# Patient Record
Sex: FEMALE | Race: WHITE | NOT HISPANIC OR LATINO | Employment: FULL TIME | URBAN - METROPOLITAN AREA
[De-identification: names, ages, dates, MRNs, and addresses within clinical notes are randomized per-mention and may not be internally consistent; named-entity substitution may affect disease eponyms.]

---

## 2017-03-03 ENCOUNTER — HOSPITAL ENCOUNTER (OUTPATIENT)
Dept: LAB | Facility: MEDICAL CENTER | Age: 22
End: 2017-03-03
Attending: SPECIALIST

## 2017-03-03 PROCEDURE — 87591 N.GONORRHOEAE DNA AMP PROB: CPT

## 2017-03-03 PROCEDURE — 87624 HPV HI-RISK TYP POOLED RSLT: CPT

## 2017-03-03 PROCEDURE — 87491 CHLMYD TRACH DNA AMP PROBE: CPT

## 2017-03-03 PROCEDURE — 88175 CYTOPATH C/V AUTO FLUID REDO: CPT

## 2017-03-07 LAB
C TRACH DNA GENITAL QL NAA+PROBE: NEGATIVE
CYTOLOGY REG CYTOL: NORMAL
HPV HR 12 DNA CVX QL NAA+PROBE: NEGATIVE
HPV16 DNA SPEC QL NAA+PROBE: NEGATIVE
HPV18 DNA SPEC QL NAA+PROBE: NEGATIVE
N GONORRHOEA DNA GENITAL QL NAA+PROBE: NEGATIVE
SPECIMEN SOURCE: NORMAL
SPECIMEN SOURCE: NORMAL

## 2017-11-19 ENCOUNTER — APPOINTMENT (OUTPATIENT)
Dept: RADIOLOGY | Facility: MEDICAL CENTER | Age: 22
End: 2017-11-19
Attending: EMERGENCY MEDICINE
Payer: COMMERCIAL

## 2017-11-19 ENCOUNTER — HOSPITAL ENCOUNTER (EMERGENCY)
Facility: MEDICAL CENTER | Age: 22
End: 2017-11-19
Attending: EMERGENCY MEDICINE
Payer: COMMERCIAL

## 2017-11-19 VITALS
HEART RATE: 91 BPM | SYSTOLIC BLOOD PRESSURE: 128 MMHG | DIASTOLIC BLOOD PRESSURE: 72 MMHG | RESPIRATION RATE: 16 BRPM | WEIGHT: 121.47 LBS | TEMPERATURE: 97.6 F | HEIGHT: 62 IN | OXYGEN SATURATION: 95 % | BODY MASS INDEX: 22.35 KG/M2

## 2017-11-19 DIAGNOSIS — R19.7 VOMITING AND DIARRHEA: ICD-10-CM

## 2017-11-19 DIAGNOSIS — R11.10 VOMITING AND DIARRHEA: ICD-10-CM

## 2017-11-19 DIAGNOSIS — Z34.90 INTRAUTERINE PREGNANCY: ICD-10-CM

## 2017-11-19 DIAGNOSIS — E86.0 DEHYDRATION: ICD-10-CM

## 2017-11-19 LAB
ALBUMIN SERPL BCP-MCNC: 3.9 G/DL (ref 3.2–4.9)
ALBUMIN/GLOB SERPL: 1.3 G/DL
ALP SERPL-CCNC: 47 U/L (ref 30–99)
ALT SERPL-CCNC: 8 U/L (ref 2–50)
ANION GAP SERPL CALC-SCNC: 9 MMOL/L (ref 0–11.9)
APPEARANCE UR: CLEAR
AST SERPL-CCNC: 13 U/L (ref 12–45)
B-HCG SERPL-ACNC: ABNORMAL MIU/ML (ref 0–5)
BASOPHILS # BLD AUTO: 0.3 % (ref 0–1.8)
BASOPHILS # BLD: 0.04 K/UL (ref 0–0.12)
BILIRUB SERPL-MCNC: 0.8 MG/DL (ref 0.1–1.5)
BILIRUB UR QL STRIP.AUTO: NEGATIVE
BUN SERPL-MCNC: 9 MG/DL (ref 8–22)
CALCIUM SERPL-MCNC: 9.1 MG/DL (ref 8.5–10.5)
CHLORIDE SERPL-SCNC: 104 MMOL/L (ref 96–112)
CO2 SERPL-SCNC: 19 MMOL/L (ref 20–33)
COLOR UR: YELLOW
CREAT SERPL-MCNC: 0.52 MG/DL (ref 0.5–1.4)
CULTURE IF INDICATED INDCX: NO UA CULTURE
EOSINOPHIL # BLD AUTO: 0.05 K/UL (ref 0–0.51)
EOSINOPHIL NFR BLD: 0.4 % (ref 0–6.9)
ERYTHROCYTE [DISTWIDTH] IN BLOOD BY AUTOMATED COUNT: 40.6 FL (ref 35.9–50)
GFR SERPL CREATININE-BSD FRML MDRD: >60 ML/MIN/1.73 M 2
GLOBULIN SER CALC-MCNC: 3 G/DL (ref 1.9–3.5)
GLUCOSE SERPL-MCNC: 92 MG/DL (ref 65–99)
GLUCOSE UR STRIP.AUTO-MCNC: NEGATIVE MG/DL
HCT VFR BLD AUTO: 40.9 % (ref 37–47)
HGB BLD-MCNC: 14 G/DL (ref 12–16)
IMM GRANULOCYTES # BLD AUTO: 0.05 K/UL (ref 0–0.11)
IMM GRANULOCYTES NFR BLD AUTO: 0.4 % (ref 0–0.9)
KETONES UR STRIP.AUTO-MCNC: >=160 MG/DL
LEUKOCYTE ESTERASE UR QL STRIP.AUTO: NEGATIVE
LIPASE SERPL-CCNC: 10 U/L (ref 11–82)
LYMPHOCYTES # BLD AUTO: 1.06 K/UL (ref 1–4.8)
LYMPHOCYTES NFR BLD: 9.2 % (ref 22–41)
MCH RBC QN AUTO: 29.7 PG (ref 27–33)
MCHC RBC AUTO-ENTMCNC: 34.2 G/DL (ref 33.6–35)
MCV RBC AUTO: 86.7 FL (ref 81.4–97.8)
MICRO URNS: NORMAL
MONOCYTES # BLD AUTO: 0.5 K/UL (ref 0–0.85)
MONOCYTES NFR BLD AUTO: 4.3 % (ref 0–13.4)
NEUTROPHILS # BLD AUTO: 9.81 K/UL (ref 2–7.15)
NEUTROPHILS NFR BLD: 85.4 % (ref 44–72)
NITRITE UR QL STRIP.AUTO: NEGATIVE
NRBC # BLD AUTO: 0 K/UL
NRBC BLD AUTO-RTO: 0 /100 WBC
NUMBER OF RH DOSES IND 8505RD: NORMAL
PH UR STRIP.AUTO: 5 [PH]
PLATELET # BLD AUTO: 231 K/UL (ref 164–446)
PMV BLD AUTO: 9.7 FL (ref 9–12.9)
POTASSIUM SERPL-SCNC: 3.8 MMOL/L (ref 3.6–5.5)
PROT SERPL-MCNC: 6.9 G/DL (ref 6–8.2)
PROT UR QL STRIP: NEGATIVE MG/DL
RBC # BLD AUTO: 4.72 M/UL (ref 4.2–5.4)
RBC UR QL AUTO: NEGATIVE
RH BLD: NORMAL
SODIUM SERPL-SCNC: 132 MMOL/L (ref 135–145)
SP GR UR STRIP.AUTO: 1.01
UROBILINOGEN UR STRIP.AUTO-MCNC: 0.2 MG/DL
WBC # BLD AUTO: 11.5 K/UL (ref 4.8–10.8)

## 2017-11-19 PROCEDURE — 700111 HCHG RX REV CODE 636 W/ 250 OVERRIDE (IP): Performed by: EMERGENCY MEDICINE

## 2017-11-19 PROCEDURE — 84702 CHORIONIC GONADOTROPIN TEST: CPT

## 2017-11-19 PROCEDURE — 83690 ASSAY OF LIPASE: CPT

## 2017-11-19 PROCEDURE — 81003 URINALYSIS AUTO W/O SCOPE: CPT

## 2017-11-19 PROCEDURE — 96360 HYDRATION IV INFUSION INIT: CPT

## 2017-11-19 PROCEDURE — 99285 EMERGENCY DEPT VISIT HI MDM: CPT

## 2017-11-19 PROCEDURE — 76817 TRANSVAGINAL US OBSTETRIC: CPT

## 2017-11-19 PROCEDURE — 86901 BLOOD TYPING SEROLOGIC RH(D): CPT

## 2017-11-19 PROCEDURE — 80053 COMPREHEN METABOLIC PANEL: CPT

## 2017-11-19 PROCEDURE — 700105 HCHG RX REV CODE 258: Performed by: EMERGENCY MEDICINE

## 2017-11-19 PROCEDURE — 85025 COMPLETE CBC W/AUTO DIFF WBC: CPT

## 2017-11-19 PROCEDURE — 76815 OB US LIMITED FETUS(S): CPT

## 2017-11-19 PROCEDURE — 96361 HYDRATE IV INFUSION ADD-ON: CPT

## 2017-11-19 RX ORDER — ONDANSETRON 4 MG/1
4 TABLET, ORALLY DISINTEGRATING ORAL ONCE
Status: COMPLETED | OUTPATIENT
Start: 2017-11-19 | End: 2017-11-19

## 2017-11-19 RX ORDER — SODIUM CHLORIDE 9 MG/ML
INJECTION, SOLUTION INTRAVENOUS CONTINUOUS
Status: DISCONTINUED | OUTPATIENT
Start: 2017-11-19 | End: 2017-11-19 | Stop reason: HOSPADM

## 2017-11-19 RX ORDER — ONDANSETRON 4 MG/1
4 TABLET, FILM COATED ORAL EVERY 4 HOURS PRN
Qty: 10 TAB | Refills: 0 | Status: ON HOLD | OUTPATIENT
Start: 2017-11-19 | End: 2018-04-22

## 2017-11-19 RX ADMIN — ONDANSETRON 4 MG: 4 TABLET, ORALLY DISINTEGRATING ORAL at 15:14

## 2017-11-19 RX ADMIN — SODIUM CHLORIDE: 9 INJECTION, SOLUTION INTRAVENOUS at 13:00

## 2017-11-19 ASSESSMENT — PAIN SCALES - GENERAL: PAINLEVEL_OUTOF10: 0

## 2017-11-19 NOTE — ED NOTES
Chief Complaint   Patient presents with   • N/V     x 6 since 0630   • Pregnancy     Approximately 18 weeks.  Pt reports she has been getting prenatal care at [facility].    • Abdominal Cramps     Pt denies bleeding    Pt to triage in NAD.  Pt educated on triage process and instructed to notify triage RN of any change in status.

## 2017-11-19 NOTE — ED PROVIDER NOTES
"ED Provider Note  CHIEF COMPLAINT  Chief Complaint   Patient presents with   • N/V     x 6 since 0630   • Pregnancy     Approximately 18 weeks.  Pt reports she has been getting prenatal care at [facility].    • Abdominal Cramps     Pt denies bleeding        HPI  Cinthia Washington is a 22 y.o. female who presentsWith nausea, vomiting, and abdominal cramps that started about 6:30 this morning. She's had no prenatal care here at the hospital. She supposedly is about 18 weeks pregnant. She really has no abdominal pain. She's had no vaginal bleeding or vaginal discharge. Is mainly been the nausea and vomiting with the cramps.    REVIEW OF SYSTEMS  No headache, no chest pain, no difficulty breathing. No blood in her bowel movement. No urinary symptoms.  ALL OTHER SYSTEMS NEGATIVE    ALLERGIES  No Known Allergies    PAST MEDICAL HISTORY  Negative      FAMILY HISTORY  Family History   Problem Relation Age of Onset   • Cancer Father      prostate       SOCIAL HISTORY  Negative    PHYSICAL EXAM  GENERAL: Alert dehydrated female adult  VITAL SIGNS: /72   Pulse (!) 102   Temp 36.4 °C (97.6 °F) (Temporal)   Resp 16   Ht 1.575 m (5' 2\")   Wt 55.1 kg (121 lb 7.6 oz)   SpO2 97%   BMI 22.22 kg/m²   Constitutional: Alert healthy-appearing dehydrated female adult   HENT: Scalp is normal size and nontender. Ears are clear. Nose is clear. Throat is clear with no stridor no drooling no trismus. Teeth are all intact.  Eyes: Pupils equal round and reactive to light, extraocular motor fall. There is no scleral icterus.  Neck: Neck is supple and nontender. There is no meningismus. No adenitis. No thyromegaly.  Lymphatic: No adenopathy.   Cardiovascular: Heart regular rhythm without murmurs or gallops   Thorax & Lungs: No chest wall tenderness. Lungs are clear. Patient has good breath sounds bilateral. No rales, no rhonchi, no wheezes.  Abdomen: Abdomen is soft, nontender, not rigid, no guarding, and no organomegaly. There " is no palpable hernia   Skin: Warm, pink, and dry with no erythema and no rash.   Back: Nontender, no midline bony tenderness, no flank tenderness.   uterus is palpable below the umbilicus. She has no vaginal bleeding and no vaginal discharge.  Extremities: Full range of motion  No tenderness to palpation and no deformities noted. No calf or thigh swelling. No calf or thigh tenderness. No clinical DVT.  Neurologic: Alert & oriented . Cranial nerves are grossly intact as tested. Patient moves all 4 extremities well. Patient has good strong flexion and extension of the ankle joints knee joints hip joints and elbow joints. Sensation is normal and symmetrical in the upper and lower extremities.   Psychiatric: Patient is alert oriented coherent and rational.     RADIOLOGY/PROCEDURES  US-OB LIMITED TRANSABDOMINAL   Final Result      Single intrauterine pregnancy of an estimated gestational age of 17 weeks 4 days with an estimated date of delivery of 4/25/2018.               COURSE & MEDICAL DECISION MAKING  Patient presents for evaluation of abdominal cramps. She's had no prenatal care here. She is supposedly about 18 weeks pregnant.    Plan: #1 IV #2 intravenous Zofran and Dilaudid No. 3 lab including CBC, CMP, quantitative hCG, Rh, ProTime. #4. Pelvic ultrasound    Laboratory and reexamination: Ultrasound shows a 17-1/2 week intrauterine pregnancy. Stable. I count slightly elevated. No anemia. No bandemia. Chemistry panel normal with no renal or liver dysfunction. Rh is positive.    Results for orders placed or performed during the hospital encounter of 11/19/17   CBC WITH DIFFERENTIAL   Result Value Ref Range    WBC 11.5 (H) 4.8 - 10.8 K/uL    RBC 4.72 4.20 - 5.40 M/uL    Hemoglobin 14.0 12.0 - 16.0 g/dL    Hematocrit 40.9 37.0 - 47.0 %    MCV 86.7 81.4 - 97.8 fL    MCH 29.7 27.0 - 33.0 pg    MCHC 34.2 33.6 - 35.0 g/dL    RDW 40.6 35.9 - 50.0 fL    Platelet Count 231 164 - 446 K/uL    MPV 9.7 9.0 - 12.9 fL     Neutrophils-Polys 85.40 (H) 44.00 - 72.00 %    Lymphocytes 9.20 (L) 22.00 - 41.00 %    Monocytes 4.30 0.00 - 13.40 %    Eosinophils 0.40 0.00 - 6.90 %    Basophils 0.30 0.00 - 1.80 %    Immature Granulocytes 0.40 0.00 - 0.90 %    Nucleated RBC 0.00 /100 WBC    Neutrophils (Absolute) 9.81 (H) 2.00 - 7.15 K/uL    Lymphs (Absolute) 1.06 1.00 - 4.80 K/uL    Monos (Absolute) 0.50 0.00 - 0.85 K/uL    Eos (Absolute) 0.05 0.00 - 0.51 K/uL    Baso (Absolute) 0.04 0.00 - 0.12 K/uL    Immature Granulocytes (abs) 0.05 0.00 - 0.11 K/uL    NRBC (Absolute) 0.00 K/uL   COMP METABOLIC PANEL   Result Value Ref Range    Sodium 132 (L) 135 - 145 mmol/L    Potassium 3.8 3.6 - 5.5 mmol/L    Chloride 104 96 - 112 mmol/L    Co2 19 (L) 20 - 33 mmol/L    Anion Gap 9.0 0.0 - 11.9    Glucose 92 65 - 99 mg/dL    Bun 9 8 - 22 mg/dL    Creatinine 0.52 0.50 - 1.40 mg/dL    Calcium 9.1 8.5 - 10.5 mg/dL    AST(SGOT) 13 12 - 45 U/L    ALT(SGPT) 8 2 - 50 U/L    Alkaline Phosphatase 47 30 - 99 U/L    Total Bilirubin 0.8 0.1 - 1.5 mg/dL    Albumin 3.9 3.2 - 4.9 g/dL    Total Protein 6.9 6.0 - 8.2 g/dL    Globulin 3.0 1.9 - 3.5 g/dL    A-G Ratio 1.3 g/dL   LIPASE   Result Value Ref Range    Lipase 10 (L) 11 - 82 U/L   HCG QUANTITATIVE SERUM   Result Value Ref Range    Bhcg 99089.9 (H) 0.0 - 5.0 mIU/mL   RH TYPE FOR RHOGAM FROM E.D.   Result Value Ref Range    Emergency Department Rh Typing POS     Number Of Rh Doses Indicated ZERO    ESTIMATED GFR   Result Value Ref Range    GFR If African American >60 >60 mL/min/1.73 m 2    GFR If Non African American >60 >60 mL/min/1.73 m 2      At 4 PM the patient is awake and oriented. She is well-hydrated. She feels significantly better. She is now well-hydrated. She stable for discharge. She been given a copy of all her lab and ultrasound results. She'll follow up with her OB or return as needed. Instructions on gastroenteritis given.  FINAL IMPRESSION  1. Vomiting and diarrhea and gastroenteritis  2. Dehydration  3. Stable intrauterine pregnancy 17-1/2 weeks         Electronically signed by: Gary Gansert, 11/19/2017 /4 PM

## 2017-11-19 NOTE — DISCHARGE INSTRUCTIONS
Dehydration, Adult  Dehydration is when you lose more fluids from the body than you take in. Vital organs like the kidneys, brain, and heart cannot function without a proper amount of fluids and salt. Any loss of fluids from the body can cause dehydration.   CAUSES   · Vomiting.  · Diarrhea.  · Excessive sweating.  · Excessive urine output.  · Fever.  SYMPTOMS   Mild dehydration  · Thirst.  · Dry lips.  · Slightly dry mouth.  Moderate dehydration  · Very dry mouth.  · Sunken eyes.  · Skin does not bounce back quickly when lightly pinched and released.  · Dark urine and decreased urine production.  · Decreased tear production.  · Headache.  Severe dehydration  · Very dry mouth.  · Extreme thirst.  · Rapid, weak pulse (more than 100 beats per minute at rest).  · Cold hands and feet.  · Not able to sweat in spite of heat and temperature.  · Rapid breathing.  · Blue lips.  · Confusion and lethargy.  · Difficulty being awakened.  · Minimal urine production.  · No tears.  DIAGNOSIS   Your caregiver will diagnose dehydration based on your symptoms and your exam. Blood and urine tests will help confirm the diagnosis. The diagnostic evaluation should also identify the cause of dehydration.  TREATMENT   Treatment of mild or moderate dehydration can often be done at home by increasing the amount of fluids that you drink. It is best to drink small amounts of fluid more often. Drinking too much at one time can make vomiting worse. Refer to the home care instructions below.  Severe dehydration needs to be treated at the hospital where you will probably be given intravenous (IV) fluids that contain water and electrolytes.  HOME CARE INSTRUCTIONS   · Ask your caregiver about specific rehydration instructions.  · Drink enough fluids to keep your urine clear or pale yellow.  · Drink small amounts frequently if you have nausea and vomiting.  · Eat as you normally do.  · Avoid:  ¨ Foods or drinks high in sugar.  ¨ Carbonated  drinks.  ¨ Juice.  ¨ Extremely hot or cold fluids.  ¨ Drinks with caffeine.  ¨ Fatty, greasy foods.  ¨ Alcohol.  ¨ Tobacco.  ¨ Overeating.  ¨ Gelatin desserts.  · Wash your hands well to avoid spreading bacteria and viruses.  · Only take over-the-counter or prescription medicines for pain, discomfort, or fever as directed by your caregiver.  · Ask your caregiver if you should continue all prescribed and over-the-counter medicines.  · Keep all follow-up appointments with your caregiver.  SEEK MEDICAL CARE IF:  · You have abdominal pain and it increases or stays in one area (localizes).  · You have a rash, stiff neck, or severe headache.  · You are irritable, sleepy, or difficult to awaken.  · You are weak, dizzy, or extremely thirsty.  SEEK IMMEDIATE MEDICAL CARE IF:   · You are unable to keep fluids down or you get worse despite treatment.  · You have frequent episodes of vomiting or diarrhea.  · You have blood or green matter (bile) in your vomit.  · You have blood in your stool or your stool looks black and tarry.  · You have not urinated in 6 to 8 hours, or you have only urinated a small amount of very dark urine.  · You have a fever.  · You faint.  MAKE SURE YOU:   · Understand these instructions.  · Will watch your condition.  · Will get help right away if you are not doing well or get worse.     This information is not intended to replace advice given to you by your health care provider. Make sure you discuss any questions you have with your health care provider.     Document Released: 12/18/2006 Document Revised: 03/11/2013 Document Reviewed: 08/06/2012  [employer] Interactive Patient Education ©2016 [employer]    Diarrhea  Diarrhea is frequent loose and watery bowel movements. It can cause you to feel weak and dehydrated. Dehydration can cause you to become tired and thirsty, have a dry mouth, and have decreased urination that often is dark yellow. Diarrhea is a sign of another problem, most often an  infection that will not last long. In most cases, diarrhea typically lasts 2-3 days. However, it can last longer if it is a sign of something more serious. It is important to treat your diarrhea as directed by your caregiver to lessen or prevent future episodes of diarrhea.  CAUSES   Some common causes include:  · Gastrointestinal infections caused by viruses, bacteria, or parasites.  · Food poisoning or food allergies.  · Certain medicines, such as antibiotics, chemotherapy, and laxatives.  · Artificial sweeteners and fructose.  · Digestive disorders.  HOME CARE INSTRUCTIONS  · Ensure adequate fluid intake (hydration): Have 1 cup (8 oz) of fluid for each diarrhea episode. Avoid fluids that contain simple sugars or sports drinks, fruit juices, whole milk products, and sodas. Your urine should be clear or pale yellow if you are drinking enough fluids. Hydrate with an oral rehydration solution that you can purchase at pharmacies, retail stores, and online. You can prepare an oral rehydration solution at home by mixing the following ingredients together:  ¨  - tsp table salt.  ¨ ¾ tsp baking soda.  ¨  tsp salt substitute containing potassium chloride.  ¨ 1  tablespoons sugar.  ¨ 1 L (34 oz) of water.  · Certain foods and beverages may increase the speed at which food moves through the gastrointestinal (GI) tract. These foods and beverages should be avoided and include:  ¨ Caffeinated and alcoholic beverages.  ¨ High-fiber foods, such as raw fruits and vegetables, nuts, seeds, and whole grain breads and cereals.  ¨ Foods and beverages sweetened with sugar alcohols, such as xylitol, sorbitol, and mannitol.  · Some foods may be well tolerated and may help thicken stool including:  ¨ Starchy foods, such as rice, toast, pasta, low-sugar cereal, oatmeal, grits, baked potatoes, crackers, and bagels.  ¨ Bananas.  ¨ Applesauce.  · Add probiotic-rich foods to help increase healthy bacteria in the GI tract, such as yogurt and  fermented milk products.  · Wash your hands well after each diarrhea episode.  · Only take over-the-counter or prescription medicines as directed by your caregiver.  · Take a warm bath to relieve any burning or pain from frequent diarrhea episodes.  SEEK IMMEDIATE MEDICAL CARE IF:   · You are unable to keep fluids down.  · You have persistent vomiting.  · You have blood in your stool, or your stools are black and tarry.  · You do not urinate in 6-8 hours, or there is only a small amount of very dark urine.  · You have abdominal pain that increases or localizes.  · You have weakness, dizziness, confusion, or light-headedness.  · You have a severe headache.  · Your diarrhea gets worse or does not get better.  · You have a fever or persistent symptoms for more than 2-3 days.  · You have a fever and your symptoms suddenly get worse.  MAKE SURE YOU:   · Understand these instructions.  · Will watch your condition.  · Will get help right away if you are not doing well or get worse.     This information is not intended to replace advice given to you by your health care provider. Make sure you discuss any questions you have with your health care provider.     Document Released: 12/08/2003 Document Revised: 01/08/2016 Document Reviewed: 08/25/2013  [employer] Interactive Patient Education ©2016 [employer]

## 2018-03-15 ENCOUNTER — APPOINTMENT (OUTPATIENT)
Dept: OTHER | Facility: IMAGING CENTER | Age: 23
End: 2018-03-15

## 2018-03-27 ENCOUNTER — HOSPITAL ENCOUNTER (OUTPATIENT)
Dept: LAB | Facility: MEDICAL CENTER | Age: 23
End: 2018-03-27
Attending: NURSE PRACTITIONER
Payer: COMMERCIAL

## 2018-03-27 PROCEDURE — 87653 STREP B DNA AMP PROBE: CPT

## 2018-03-29 LAB — GP B STREP DNA SPEC QL NAA+PROBE: NEGATIVE

## 2018-04-15 ENCOUNTER — HOSPITAL ENCOUNTER (OUTPATIENT)
Facility: MEDICAL CENTER | Age: 23
End: 2018-04-15
Attending: OBSTETRICS & GYNECOLOGY | Admitting: OBSTETRICS & GYNECOLOGY
Payer: COMMERCIAL

## 2018-04-15 VITALS
SYSTOLIC BLOOD PRESSURE: 113 MMHG | RESPIRATION RATE: 16 BRPM | HEART RATE: 97 BPM | TEMPERATURE: 98.3 F | DIASTOLIC BLOOD PRESSURE: 69 MMHG

## 2018-04-15 PROCEDURE — 59025 FETAL NON-STRESS TEST: CPT | Performed by: OBSTETRICS & GYNECOLOGY

## 2018-04-15 NOTE — PROGRESS NOTES
0955-pt presents from home with c/o uc's this morning, no c/o leaking, bleeding or other pain, states baby is moving normally, placed on external monitors, vs taken, SVE 3/50/ballotable, was 3 cm earlier in the week,   1025-TC Dr Higginbotham, report given, discharge orders received  1030-pt discharged home with labor precautions, verbalized understanding, left ambulatory with family

## 2018-04-15 NOTE — CONSULTS
DATE OF SERVICE:  04/15/2018    HISTORY OF PRESENT ILLNESS:  Patient is a prior patient of Dr. Vickie Espinoza's, a 22-year-old female who presents to labor and delivery   complaining of contractions.  She was watched for over an hour and only had a   few contractions on the monitor.  She was checked and found to be 3 cm   dilated, which she also was on her visit last week in the office.  Fetal   tracing is category 1.  She will be sent home.  Kick counts, labor warnings   given.  She will return if contractions become more frequent, regular or   leaking of fluid.       ____________________________________     MD JU DIALLO / MEME    DD:  04/15/2018 10:34:10  DT:  04/15/2018 11:12:36    D#:  [id number]  Job#:  [id number]

## 2018-04-21 ENCOUNTER — HOSPITAL ENCOUNTER (INPATIENT)
Facility: MEDICAL CENTER | Age: 23
LOS: 3 days | End: 2018-04-24
Attending: OBSTETRICS & GYNECOLOGY | Admitting: OBSTETRICS & GYNECOLOGY
Payer: COMMERCIAL

## 2018-04-21 LAB
BASOPHILS # BLD AUTO: 0.3 % (ref 0–1.8)
BASOPHILS # BLD: 0.03 K/UL (ref 0–0.12)
EOSINOPHIL # BLD AUTO: 0.15 K/UL (ref 0–0.51)
EOSINOPHIL NFR BLD: 1.5 % (ref 0–6.9)
ERYTHROCYTE [DISTWIDTH] IN BLOOD BY AUTOMATED COUNT: 41.7 FL (ref 35.9–50)
HCT VFR BLD AUTO: 33.9 % (ref 37–47)
HGB BLD-MCNC: 11.1 G/DL (ref 12–16)
HOLDING TUBE BB 8507: NORMAL
IMM GRANULOCYTES # BLD AUTO: 0.11 K/UL (ref 0–0.11)
IMM GRANULOCYTES NFR BLD AUTO: 1.1 % (ref 0–0.9)
LYMPHOCYTES # BLD AUTO: 2.7 K/UL (ref 1–4.8)
LYMPHOCYTES NFR BLD: 27.5 % (ref 22–41)
MCH RBC QN AUTO: 28.5 PG (ref 27–33)
MCHC RBC AUTO-ENTMCNC: 32.7 G/DL (ref 33.6–35)
MCV RBC AUTO: 87.1 FL (ref 81.4–97.8)
MONOCYTES # BLD AUTO: 0.91 K/UL (ref 0–0.85)
MONOCYTES NFR BLD AUTO: 9.3 % (ref 0–13.4)
NEUTROPHILS # BLD AUTO: 5.91 K/UL (ref 2–7.15)
NEUTROPHILS NFR BLD: 60.3 % (ref 44–72)
NRBC # BLD AUTO: 0 K/UL
NRBC BLD-RTO: 0 /100 WBC
PLATELET # BLD AUTO: 246 K/UL (ref 164–446)
PMV BLD AUTO: 10 FL (ref 9–12.9)
RBC # BLD AUTO: 3.89 M/UL (ref 4.2–5.4)
WBC # BLD AUTO: 9.8 K/UL (ref 4.8–10.8)

## 2018-04-21 PROCEDURE — 85025 COMPLETE CBC W/AUTO DIFF WBC: CPT

## 2018-04-21 PROCEDURE — 770002 HCHG ROOM/CARE - OB PRIVATE (112)

## 2018-04-21 RX ORDER — DEXTROSE, SODIUM CHLORIDE, SODIUM LACTATE, POTASSIUM CHLORIDE, AND CALCIUM CHLORIDE 5; .6; .31; .03; .02 G/100ML; G/100ML; G/100ML; G/100ML; G/100ML
INJECTION, SOLUTION INTRAVENOUS CONTINUOUS
Status: DISCONTINUED | OUTPATIENT
Start: 2018-04-22 | End: 2018-04-22 | Stop reason: HOSPADM

## 2018-04-21 RX ORDER — ALUMINA, MAGNESIA, AND SIMETHICONE 2400; 2400; 240 MG/30ML; MG/30ML; MG/30ML
30 SUSPENSION ORAL EVERY 6 HOURS PRN
Status: DISCONTINUED | OUTPATIENT
Start: 2018-04-21 | End: 2018-04-22 | Stop reason: HOSPADM

## 2018-04-21 RX ORDER — MISOPROSTOL 200 UG/1
800 TABLET ORAL
Status: DISCONTINUED | OUTPATIENT
Start: 2018-04-21 | End: 2018-04-22

## 2018-04-21 RX ORDER — CITRIC ACID/SODIUM CITRATE 334-500MG
30 SOLUTION, ORAL ORAL EVERY 6 HOURS PRN
Status: DISCONTINUED | OUTPATIENT
Start: 2018-04-21 | End: 2018-04-22 | Stop reason: HOSPADM

## 2018-04-21 RX ORDER — ONDANSETRON 4 MG/1
4 TABLET, ORALLY DISINTEGRATING ORAL EVERY 6 HOURS PRN
OUTPATIENT
Start: 2018-04-21

## 2018-04-21 RX ORDER — ONDANSETRON 2 MG/ML
4 INJECTION INTRAMUSCULAR; INTRAVENOUS EVERY 6 HOURS PRN
OUTPATIENT
Start: 2018-04-21

## 2018-04-21 RX ORDER — SODIUM CHLORIDE, SODIUM LACTATE, POTASSIUM CHLORIDE, CALCIUM CHLORIDE 600; 310; 30; 20 MG/100ML; MG/100ML; MG/100ML; MG/100ML
INJECTION, SOLUTION INTRAVENOUS CONTINUOUS
Status: DISPENSED | OUTPATIENT
Start: 2018-04-21 | End: 2018-04-22

## 2018-04-22 PROCEDURE — 700101 HCHG RX REV CODE 250

## 2018-04-22 PROCEDURE — 303615 HCHG EPIDURAL/SPINAL ANESTHESIA FOR LABOR

## 2018-04-22 PROCEDURE — 85027 COMPLETE CBC AUTOMATED: CPT

## 2018-04-22 PROCEDURE — 36415 COLL VENOUS BLD VENIPUNCTURE: CPT

## 2018-04-22 PROCEDURE — A9270 NON-COVERED ITEM OR SERVICE: HCPCS | Performed by: OBSTETRICS & GYNECOLOGY

## 2018-04-22 PROCEDURE — 770002 HCHG ROOM/CARE - OB PRIVATE (112)

## 2018-04-22 PROCEDURE — 700111 HCHG RX REV CODE 636 W/ 250 OVERRIDE (IP): Performed by: OBSTETRICS & GYNECOLOGY

## 2018-04-22 PROCEDURE — 700105 HCHG RX REV CODE 258: Performed by: ANESTHESIOLOGY

## 2018-04-22 PROCEDURE — 700102 HCHG RX REV CODE 250 W/ 637 OVERRIDE(OP): Performed by: OBSTETRICS & GYNECOLOGY

## 2018-04-22 PROCEDURE — 0HQ9XZZ REPAIR PERINEUM SKIN, EXTERNAL APPROACH: ICD-10-PCS | Performed by: OBSTETRICS & GYNECOLOGY

## 2018-04-22 PROCEDURE — 304965 HCHG RECOVERY SERVICES

## 2018-04-22 PROCEDURE — 59409 OBSTETRICAL CARE: CPT

## 2018-04-22 PROCEDURE — 700105 HCHG RX REV CODE 258

## 2018-04-22 PROCEDURE — 700111 HCHG RX REV CODE 636 W/ 250 OVERRIDE (IP)

## 2018-04-22 PROCEDURE — 700105 HCHG RX REV CODE 258: Performed by: OBSTETRICS & GYNECOLOGY

## 2018-04-22 RX ORDER — SODIUM CHLORIDE, SODIUM LACTATE, POTASSIUM CHLORIDE, CALCIUM CHLORIDE 600; 310; 30; 20 MG/100ML; MG/100ML; MG/100ML; MG/100ML
INJECTION, SOLUTION INTRAVENOUS PRN
Status: DISCONTINUED | OUTPATIENT
Start: 2018-04-22 | End: 2018-04-24 | Stop reason: HOSPADM

## 2018-04-22 RX ORDER — SODIUM CHLORIDE, SODIUM LACTATE, POTASSIUM CHLORIDE, CALCIUM CHLORIDE 600; 310; 30; 20 MG/100ML; MG/100ML; MG/100ML; MG/100ML
1000 INJECTION, SOLUTION INTRAVENOUS CONTINUOUS
Status: DISCONTINUED | OUTPATIENT
Start: 2018-04-22 | End: 2018-04-24 | Stop reason: HOSPADM

## 2018-04-22 RX ORDER — ROPIVACAINE HYDROCHLORIDE 2 MG/ML
INJECTION, SOLUTION EPIDURAL; INFILTRATION; PERINEURAL
Status: COMPLETED
Start: 2018-04-22 | End: 2018-04-22

## 2018-04-22 RX ORDER — OXYCODONE AND ACETAMINOPHEN 10; 325 MG/1; MG/1
1 TABLET ORAL EVERY 4 HOURS PRN
Status: DISCONTINUED | OUTPATIENT
Start: 2018-04-22 | End: 2018-04-24 | Stop reason: HOSPADM

## 2018-04-22 RX ORDER — IBUPROFEN 600 MG/1
600 TABLET ORAL EVERY 6 HOURS PRN
Status: DISCONTINUED | OUTPATIENT
Start: 2018-04-22 | End: 2018-04-24 | Stop reason: HOSPADM

## 2018-04-22 RX ORDER — SODIUM CHLORIDE, SODIUM LACTATE, POTASSIUM CHLORIDE, CALCIUM CHLORIDE 600; 310; 30; 20 MG/100ML; MG/100ML; MG/100ML; MG/100ML
INJECTION, SOLUTION INTRAVENOUS
Status: COMPLETED
Start: 2018-04-22 | End: 2018-04-22

## 2018-04-22 RX ORDER — DOCUSATE SODIUM 100 MG/1
100 CAPSULE, LIQUID FILLED ORAL 2 TIMES DAILY PRN
Status: DISCONTINUED | OUTPATIENT
Start: 2018-04-22 | End: 2018-04-24 | Stop reason: HOSPADM

## 2018-04-22 RX ORDER — MISOPROSTOL 200 UG/1
600 TABLET ORAL
Status: DISCONTINUED | OUTPATIENT
Start: 2018-04-22 | End: 2018-04-24 | Stop reason: HOSPADM

## 2018-04-22 RX ORDER — VITAMIN A ACETATE, BETA CAROTENE, ASCORBIC ACID, CHOLECALCIFEROL, .ALPHA.-TOCOPHEROL ACETATE, DL-, THIAMINE MONONITRATE, RIBOFLAVIN, NIACINAMIDE, PYRIDOXINE HYDROCHLORIDE, FOLIC ACID, CYANOCOBALAMIN, CALCIUM CARBONATE, FERROUS FUMARATE, ZINC OXIDE, CUPRIC OXIDE 3080; 12; 120; 400; 1; 1.84; 3; 20; 22; 920; 25; 200; 27; 10; 2 [IU]/1; UG/1; MG/1; [IU]/1; MG/1; MG/1; MG/1; MG/1; MG/1; [IU]/1; MG/1; MG/1; MG/1; MG/1; MG/1
1 TABLET, FILM COATED ORAL EVERY MORNING
Status: DISCONTINUED | OUTPATIENT
Start: 2018-04-23 | End: 2018-04-24 | Stop reason: HOSPADM

## 2018-04-22 RX ORDER — OXYCODONE HYDROCHLORIDE AND ACETAMINOPHEN 5; 325 MG/1; MG/1
1 TABLET ORAL EVERY 4 HOURS PRN
Status: DISCONTINUED | OUTPATIENT
Start: 2018-04-22 | End: 2018-04-24 | Stop reason: HOSPADM

## 2018-04-22 RX ORDER — RANITIDINE 150 MG/1
150 TABLET ORAL PRN
Status: ON HOLD | COMMUNITY
End: 2018-04-24

## 2018-04-22 RX ORDER — IBUPROFEN 600 MG/1
600 TABLET ORAL EVERY 6 HOURS PRN
Status: CANCELLED | OUTPATIENT
Start: 2018-04-22

## 2018-04-22 RX ORDER — OXYCODONE HYDROCHLORIDE AND ACETAMINOPHEN 5; 325 MG/1; MG/1
1 TABLET ORAL EVERY 4 HOURS PRN
Status: CANCELLED | OUTPATIENT
Start: 2018-04-22

## 2018-04-22 RX ADMIN — IBUPROFEN 600 MG: 600 TABLET, FILM COATED ORAL at 16:39

## 2018-04-22 RX ADMIN — OXYCODONE HYDROCHLORIDE AND ACETAMINOPHEN 1 TABLET: 5; 325 TABLET ORAL at 21:44

## 2018-04-22 RX ADMIN — ALUMINUM HYDROXIDE, MAGNESIUM HYDROXIDE,SIMETHICONE 30 ML: 400; 400; 40 LIQUID ORAL at 12:50

## 2018-04-22 RX ADMIN — ROPIVACAINE HYDROCHLORIDE 100 ML: 2 INJECTION, SOLUTION EPIDURAL; INFILTRATION at 09:00

## 2018-04-22 RX ADMIN — SODIUM CHLORIDE, SODIUM LACTATE, POTASSIUM CHLORIDE, CALCIUM CHLORIDE AND DEXTROSE MONOHYDRATE: 5; 600; 310; 30; 20 INJECTION, SOLUTION INTRAVENOUS at 10:07

## 2018-04-22 RX ADMIN — OXYCODONE HYDROCHLORIDE AND ACETAMINOPHEN 1 TABLET: 5; 325 TABLET ORAL at 16:50

## 2018-04-22 RX ADMIN — EPHEDRINE SULFATE 5 MG: 50 INJECTION INTRAVENOUS at 10:07

## 2018-04-22 RX ADMIN — Medication 125 ML/HR: at 16:40

## 2018-04-22 RX ADMIN — SODIUM CHLORIDE, POTASSIUM CHLORIDE, SODIUM LACTATE AND CALCIUM CHLORIDE 1000 ML: 600; 310; 30; 20 INJECTION, SOLUTION INTRAVENOUS at 08:15

## 2018-04-22 RX ADMIN — Medication 1 MILLI-UNITS/MIN: at 06:02

## 2018-04-22 RX ADMIN — SODIUM CHLORIDE, POTASSIUM CHLORIDE, SODIUM LACTATE AND CALCIUM CHLORIDE 1000 ML: 600; 310; 30; 20 INJECTION, SOLUTION INTRAVENOUS at 09:05

## 2018-04-22 ASSESSMENT — LIFESTYLE VARIABLES
ALCOHOL_USE: NO
DO YOU DRINK ALCOHOL: NO
DO YOU DRINK ALCOHOL: NO
EVER_SMOKED: NEVER

## 2018-04-22 ASSESSMENT — PAIN SCALES - GENERAL: PAINLEVEL_OUTOF10: 5

## 2018-04-22 ASSESSMENT — PATIENT HEALTH QUESTIONNAIRE - PHQ9
2. FEELING DOWN, DEPRESSED, IRRITABLE, OR HOPELESS: NOT AT ALL
SUM OF ALL RESPONSES TO PHQ9 QUESTIONS 1 AND 2: 0
1. LITTLE INTEREST OR PLEASURE IN DOING THINGS: NOT AT ALL

## 2018-04-22 NOTE — DELIVERY NOTE
Viable male infant  AG= at 1539  Placenta intact 3VC  Perineal skin lac repaired sub-Q and interrupted fashion  EBL= 300cc  No complications

## 2018-04-22 NOTE — DELIVERY NOTE
DATE OF SERVICE:  04/22/2018    DELIVERY NOTE:  This is a patient of Dr. Espinoza's I delivered while on call.    The patient came in active labor.  She did have a prolonged second stage and   was started on Pitocin for augmentation.  She received an epidural.  She had   rupture of membranes with meconium.  She went on to deliver a viable male   infant by spontaneous vaginal delivery over sterile prep.  Apgars equal to 9   and 9 at 15 and 39.  Only gentle traction was used in aid of delivery of the   infant.  Infant delivered in DOMENIC position.  Infant's mouth was bulb suctioned   on the perineum.  Rest of infant delivered and was placed on mother's stomach.    There was instantaneous spontaneous cry.  Cord gas segments for arterial gas   was sent.  Cord was allowed to pulsate as mentioned clamped and cut.    Placenta delivered intact, 3-vessel cord shortly thereafter.  Pitocin was   infused IV.  Patient had a perineal skin laceration, which was repaired with   2-0 chromic in a subcuticular fashion with 2 interrupted skin sutures to help   with tension.  Estimated blood loss was 300 mL.  There were no complications.       ____________________________________     MD INDRA LOPEZ / NTS    DD:  04/22/2018 16:03:05  DT:  04/22/2018 16:16:19    D#:  [id number]  Job#:  [id number]    cc: [facility]

## 2018-04-22 NOTE — PROGRESS NOTES
EDC 2018   EGA 39w6d    2240: Pt presents this evening with c/o of UC's that started a couple hours ago. Pt states contractions are painful and rates them a 5 on a pain scale 0 to10. Pt declines LOF, VB, and states +FM. Pt states she was 3 cm dilated when she was check in the office early during the week.     2243: SVE /-1    2244: Dr. Gonzalez updated with patient status, orders received to admit patient to labor and delivery.     2248: Pt updated with POC, all questions and concerns addressed.     2315: Report given to VANESSA Silva RN.

## 2018-04-22 NOTE — PROGRESS NOTES
0050 Report received from VANESSA Silva RN at bedside and assumed care. POC discussed with pt and s/o and encouraged to state needs or questions at any time.    0435 SVE by RN 4/80/-1    0535 Dr. Gonzalez Called and given update.    0700 Report given to VANIA Olivares RN at bedside.

## 2018-04-22 NOTE — PROGRESS NOTES
0700- Report received. Care assumed. Pt feeling mild contractions. Desires epidural at some point. Timing of such discussed. Denies questions/concerns.   0805- Desires epidural before AROM.   0848- Sitting up for epidural.  0858- Dr. Sepulveda at bedside.   0914- Laying down after epidural. No side effect from test dose.  0917- Pt feeling dizzy and nauseous. BP 89/51. HOB lowered, pt to greater left wedge, fluids opened. Legs elevated on pillows. Repeat /57. Pt feeling better. Dr. Sepulveda notified. RN to give Ephedrine for SBP < 100. Next BP 89/50. By the time Ephedrine obtained, hypotension resolved.   1004- Dr. Gonzalez at bedside. SVE and AROM. Pt turned to right wedge and legs lowered.   1006- BP 83/50. Pt dizzy with ringing ears. Legs elevated.  1007- Ephedrine given. IVF bolusing.  1010- /65. Pt feeling better.   1055- Update to Dr. Sepulveda. Will continue POC and consider dropping epidural rate if hypotension persists.   1335- Pt comfortable since epidural. VSS with category 1 FHT. Changing side to side to peanut ball. Meconium moderate.  1432- SVE complete.   1448- Pushing started.  1539-  of vigorous male infant with 9/9 apgars.  1650- Medicated with Ibuprofen and Percocet (see MAR for exact times) for perineal/rectal pain /10. Perineum and labia edematous and bruised. Soft to touch. Ice pack changed. HOB lowered. Will monitor closely.

## 2018-04-22 NOTE — H&P
CHIEF COMPLAINT:  Labor.    HISTORY OF PRESENT ILLNESS:  A 22-year-old G2, P1-0-0-1 with intrauterine   pregnancy at 39-5/7th weeks, comes in with complaints of labor.  She was   checked earlier in the week, was told she was 3 cm.  She now is 4 cm dilated   and in an active labor pattern.  She states she is having painful   contractions.  She otherwise has good fetal movement.  No vaginal bleeding.    No loss of fluid.  No signs or symptoms of preeclampsia.  She relates an   uncomplicated pregnancy.    PAST MEDICAL HISTORY:  None.    PAST SURGICAL HISTORY:  None.    ALLERGIES:  No known drug allergies.    SOCIAL HISTORY:  Denies tobacco, alcohol or drug use.    FAMILY HISTORY:  Noncontributory.    MEDICATIONS:  Vitamins.    SEXUAL HISTORY:  Denies history of herpes for herself.  Father of baby denies   history of herpes for himself.    OBSTETRICAL HISTORY:  Patient had 1 term vaginal delivery that was   vacuum-assisted.    REVIEW OF SYSTEMS:  As in HPI, otherwise negative for greater than 10 systems.    She does not have any chest pain nor heart palpitations.  No signs or   symptoms of DVT.    PHYSICAL EXAMINATION:  VITAL SIGNS:  Blood pressure 123/69, pulse 82, temperature 97.6, weight 143   pounds.  GENERAL:  Patient in some pain with contractions, but otherwise in no apparent   distress.  ABDOMEN:  Gravid, soft, nontender, nondistended.  No masses.  No guarding,   rebound, no peritoneal signs.  GENITOURINARY:  Per nurse, external genitalia is normal, no lesions.  Vagina   is normal, no lesions.  Cervix 4 cm dilated.  Uterus nontender.  Adnexa, no   masses.  EXTREMITIES:  No signs or symptoms of DVT.    ASSESSMENT:  1.  Intrauterine pregnancy at term.  2.  Active labor.  3.  Group B streptococcus negative.    PLAN:  Our plan is to admit patient and undergo expectant management.  She   would like pain management such as epidural.  Expect to have spontaneous   vaginal delivery.  Estimated fetal weight is 3100 g.        ____________________________________     MD INDRA LOPEZ    DD:  04/21/2018 23:38:52  DT:  04/21/2018 23:53:17    D#:  [id number]  Job#:  [id number]    cc: [facility]

## 2018-04-22 NOTE — CARE PLAN
Problem: Pain  Goal: Alleviation of Pain or a reduction in pain to the patient's comfort goal  Outcome: PROGRESSING AS EXPECTED  Pt is coping well with pain at this time but would like an epidural as labor progresses. Will continue to assess.    Problem: Risk for Infection, Impaired Wound Healing  Goal: Remain free from signs and symptoms of infection  Outcome: PROGRESSING AS EXPECTED  Pt is afebrile and free from s/s of infection at this time. Will continue to assess.

## 2018-04-23 LAB
ERYTHROCYTE [DISTWIDTH] IN BLOOD BY AUTOMATED COUNT: 41.1 FL (ref 35.9–50)
HCT VFR BLD AUTO: 28.4 % (ref 37–47)
HGB BLD-MCNC: 9.4 G/DL (ref 12–16)
MCH RBC QN AUTO: 28.8 PG (ref 27–33)
MCHC RBC AUTO-ENTMCNC: 33.1 G/DL (ref 33.6–35)
MCV RBC AUTO: 87.1 FL (ref 81.4–97.8)
PLATELET # BLD AUTO: 201 K/UL (ref 164–446)
PMV BLD AUTO: 9.8 FL (ref 9–12.9)
RBC # BLD AUTO: 3.26 M/UL (ref 4.2–5.4)
WBC # BLD AUTO: 10.7 K/UL (ref 4.8–10.8)

## 2018-04-23 PROCEDURE — 770002 HCHG ROOM/CARE - OB PRIVATE (112)

## 2018-04-23 PROCEDURE — 700102 HCHG RX REV CODE 250 W/ 637 OVERRIDE(OP): Performed by: OBSTETRICS & GYNECOLOGY

## 2018-04-23 PROCEDURE — 700112 HCHG RX REV CODE 229: Performed by: OBSTETRICS & GYNECOLOGY

## 2018-04-23 PROCEDURE — A9270 NON-COVERED ITEM OR SERVICE: HCPCS | Performed by: OBSTETRICS & GYNECOLOGY

## 2018-04-23 RX ADMIN — OXYCODONE HYDROCHLORIDE AND ACETAMINOPHEN 1 TABLET: 5; 325 TABLET ORAL at 18:05

## 2018-04-23 RX ADMIN — IBUPROFEN 600 MG: 600 TABLET, FILM COATED ORAL at 05:04

## 2018-04-23 RX ADMIN — OXYCODONE HYDROCHLORIDE AND ACETAMINOPHEN 1 TABLET: 5; 325 TABLET ORAL at 05:04

## 2018-04-23 RX ADMIN — IBUPROFEN 600 MG: 600 TABLET, FILM COATED ORAL at 18:05

## 2018-04-23 RX ADMIN — Medication 1 TABLET: at 09:04

## 2018-04-23 RX ADMIN — OXYCODONE HYDROCHLORIDE AND ACETAMINOPHEN 1 TABLET: 5; 325 TABLET ORAL at 22:59

## 2018-04-23 RX ADMIN — OXYCODONE HYDROCHLORIDE AND ACETAMINOPHEN 1 TABLET: 5; 325 TABLET ORAL at 10:43

## 2018-04-23 RX ADMIN — DOCUSATE SODIUM 100 MG: 100 CAPSULE ORAL at 02:00

## 2018-04-23 ASSESSMENT — PAIN SCALES - GENERAL
PAINLEVEL_OUTOF10: 5
PAINLEVEL_OUTOF10: 2
PAINLEVEL_OUTOF10: 4
PAINLEVEL_OUTOF10: 6
PAINLEVEL_OUTOF10: 2
PAINLEVEL_OUTOF10: 1
PAINLEVEL_OUTOF10: 3
PAINLEVEL_OUTOF10: 1
PAINLEVEL_OUTOF10: 2
PAINLEVEL_OUTOF10: 5
PAINLEVEL_OUTOF10: 2

## 2018-04-23 NOTE — PROGRESS NOTES
PPD#1 s/p     S/ feels good, just swollen from delivery, baby latching  O/  Vitals:    18 1610 18 2000 18 0000 18 0748   BP: 116/58 107/72 110/71 120/75   Pulse: 97 60 96 95   Resp:  16 16 20   Temp:  36.8 °C (98.2 °F) 36.6 °C (97.8 °F) 36.7 °C (98 °F)   TempSrc:       SpO2:  96% 98% 97%   Weight:       Height:         gen-nad  Abd-soft, ff below umb  ext-no edema  Noy-yan labial swelling, moderate    Recent Labs      18   2347  18   2355   WBC  9.8  10.7   RBC  3.89*  3.26*   HEMOGLOBIN  11.1*  9.4*   HEMATOCRIT  33.9*  28.4*   MCV  87.1  87.1   MCH  28.5  28.8   RDW  41.7  41.1   PLATELETCT  246  201   MPV  10.0  9.8   NEUTSPOLYS  60.30   --    LYMPHOCYTES  27.50   --    MONOCYTES  9.30   --    EOSINOPHILS  1.50   --    BASOPHILS  0.30   --      A&p/ppd#1 s/p   Stable  Cpm  Anticipate d/c in am

## 2018-04-23 NOTE — PROGRESS NOTES
1935- Bedside report received from JHON Earl.  Patient denied needs.  0712- Patient assessment done.  Patient stated that she is voiding without difficulty and passing flatus.  Patient denied dizziness and stated that she is walking without difficulty.  Discussed pain management plan and patient prefers to call for pain intervention as needed.  Reviewed plan of care.  FOB at bedside.

## 2018-04-23 NOTE — PROGRESS NOTES
Pt to new room and assisted to bed-bedside report received-pt assessed-ice to perineum for pain and edema-tucks and spray to bathroom and pt educated on lorena care- to mom and bonding is evident-pt educated on call/emergency light and security of infant.

## 2018-04-23 NOTE — PROGRESS NOTES
1745- Pt up to the BR. Voided. Steady on feet. Pericare and education done. Pt verbalized understanding. Ice pack placed

## 2018-04-23 NOTE — CARE PLAN
Problem: Pain Management  Goal: Pain level will decrease to patient's comfort goal  Pt pain at a comfortable level, will continue to monitor and medicate per MAR    Problem: Potential knowledge deficit related to lack of understanding of self and  care  Goal: Patient will verbalize understanding of self and infant care  Pt able to care for self and infant.

## 2018-04-23 NOTE — PROGRESS NOTES
Assumed care of patient, report at bedside from, Meseret FERREIRA. Assessment completed and WDL. Call light within reach, discussed plan of care, denies pain at the moment. Will continue to monitor.

## 2018-04-24 VITALS
OXYGEN SATURATION: 99 % | TEMPERATURE: 97.6 F | HEART RATE: 80 BPM | DIASTOLIC BLOOD PRESSURE: 67 MMHG | RESPIRATION RATE: 18 BRPM | SYSTOLIC BLOOD PRESSURE: 97 MMHG | WEIGHT: 143 LBS | HEIGHT: 62 IN | BODY MASS INDEX: 26.31 KG/M2

## 2018-04-24 PROCEDURE — 700102 HCHG RX REV CODE 250 W/ 637 OVERRIDE(OP): Performed by: OBSTETRICS & GYNECOLOGY

## 2018-04-24 PROCEDURE — 700112 HCHG RX REV CODE 229: Performed by: OBSTETRICS & GYNECOLOGY

## 2018-04-24 PROCEDURE — A9270 NON-COVERED ITEM OR SERVICE: HCPCS | Performed by: OBSTETRICS & GYNECOLOGY

## 2018-04-24 RX ADMIN — Medication 1 TABLET: at 08:21

## 2018-04-24 RX ADMIN — OXYCODONE HYDROCHLORIDE AND ACETAMINOPHEN 1 TABLET: 5; 325 TABLET ORAL at 04:21

## 2018-04-24 RX ADMIN — DOCUSATE SODIUM 100 MG: 100 CAPSULE ORAL at 08:32

## 2018-04-24 RX ADMIN — IBUPROFEN 600 MG: 600 TABLET, FILM COATED ORAL at 04:21

## 2018-04-24 ASSESSMENT — PAIN SCALES - GENERAL
PAINLEVEL_OUTOF10: 0
PAINLEVEL_OUTOF10: 0
PAINLEVEL_OUTOF10: 6
PAINLEVEL_OUTOF10: 1

## 2018-04-24 ASSESSMENT — LIFESTYLE VARIABLES: EVER_SMOKED: NEVER

## 2018-04-24 NOTE — PROGRESS NOTES
0900- Discharge instructions given to patient by JHON Lai, who reported that the patient verbalized understanding and signed discharge papers.  0940- Patient stated that she is ready for discharge.  Patient discharged to home, no change noted in condition, via wheelchair with infant and FOB.

## 2018-04-24 NOTE — PROGRESS NOTES
Discharge instruction for mom and baby discussed. Questions and concerns have been answered. Sleep sack given.

## 2018-04-24 NOTE — DISCHARGE INSTRUCTIONS
POSTPARTUM DISCHARGE INSTRUCTIONS FOR MOM    YOB: 1995   Age: 22 y.o.               Admit Date: 4/21/2018     Discharge Date: 4/24/2018  Attending Doctor:  Vickie Espinoza M.D.                  Allergies:  Patient has no known allergies.    Discharged to home by car. Discharged via wheelchair, hospital escort: Yes.  Special equipment needed: Not Applicable  Belongings with: Personal  Be sure to schedule a follow-up appointment with your primary care doctor or any specialists as instructed.     Discharge Plan:   Diet Plan: Discussed  Activity Level: Discussed  Confirmed Follow up Appointment: Patient to Call and Schedule Appointment  Confirmed Symptoms Management: Discussed  Medication Reconciliation Updated: Yes  Influenza Vaccine Indication: Not indicated: Previously immunized this influenza season and > 8 years of age    REASONS TO CALL YOUR OBSTETRICIAN:  1.   Persistent fever or shaking chills (Temperature higher than 100.4)  2.   Heavy bleeding (soaking more than 1 pad per hour); Passing clots  3.   Foul odor from vagina  4.   Mastitis (Breast infection; breast pain, chills, fever, redness)  5.   Urinary pain, burning or frequency  6.   Episiotomy infection  7.   Severe depression longer than 24 hours    HAND WASHING  · Prior to handling the baby.  · Before breastfeeding or bottle feeding baby.  · After using the bathroom or changing the baby's diaper.    VAGINAL CARE  · Nothing inside vagina for 6 weeks: no sexual intercourse, tampons or douching.  · Bleeding may continue for 2-4 weeks.  Amount may vary.    · Call your physician for heavy bleeding which means soaking more than 1 pad per hour    BIRTH CONTROL  · It is possible to become pregnant at any time after delivery and while breastfeeding.  · Plan to discuss a method of birth control with your physician at your follow up visit. visit.    DIET AND ELIMINATION  · Eating more fiber (bran cereal, fruits, and vegetables) and drinking plenty of  "fluids will help to avoid constipation.  · Urinary frequency after childbirth is normal.    POSTPARTUM BLUES  During the first few days after birth, you may experience a sense of the \"blues\" which may include impatience, irritability or even crying.  These feeling come and go quickly.  However, as many as 1 in 10 women experience emotional symptoms known as postpartum depression.  Postpartum depression:  May start as early as the second or third day after delivery or take several weeks or months to develop.  Symptoms of \"blues\" are present, but are more intense:  Crying spells; loss of appetite; feelings of hopelessness or loss of control; fear of touching the baby; over concern or no concern at all about the baby; little or no concern about your own appearance/caring for yourself; and/or inability to sleep or excessive sleeping.  Contact your physician if you are experiencing any of these symptoms.  Crisis Hotline:  · [place] Crisis Hotline:  8-470-TQITBXV  Or 1-537.951.1210  · [place] Crisis Hotline:  1-817.852.1172  Or 511-577-5853    PREVENTING SHAKEN BABY:  If you are angry or stressed, PUT THE BABY IN THE CRIB, step away, take some deep breaths, and wait until you are calm to care for the baby.  DO NOT SHAKE THE BABY.  You are not alone, call a supporter for help.  · Crisis Call Center 24/7 crisis line 027-319-5090 or 1-202.231.9154  · You can also text them, text \"ANSWER\" to 856941    QUIT SMOKING/TOBACCO USE:  I understand the use of any tobacco products increases my chance of suffering from future heart disease and could cause other illnesses which may shorten my life. Quitting the use of tobacco products is the single most important thing I can do to improve my health. For further information on smoking / tobacco cessation call a Toll Free Quit Line at 1-121.569.3417 (*[facility] [place]) or 1-706.945.4986 (American Lung Association) or you can access the web based program at " www.lungusa.org.  · [place] Tobacco Users Help Line:  (664) 911-6039       Toll Free: 1-984.216.9222  · Quit Tobacco Program [facility] (481)541-3924    DEPRESSION / SUICIDE RISK:  As you are discharged from this [facility], it is important to learn how to keep safe from harming yourself.    Recognize the warning signs:  · Abrupt changes in personality, positive or negative- including increase in energy   · Giving away possessions  · Change in eating patterns- significant weight changes-  positive or negative  · Change in sleeping patterns- unable to sleep or sleeping all the time   · Unwillingness or inability to communicate  · Depression  · Unusual sadness, discouragement and loneliness  · Talk of wanting to die  · Neglect of personal appearance   · Rebelliousness- reckless behavior  · Withdrawal from people/activities they love  · Confusion- inability to concentrate     If you or a loved one observes any of these behaviors or has concerns about self-harm, here's what you can do:  · Talk about it- your feelings and reasons for harming yourself  · Remove any means that you might use to hurt yourself (examples: pills, rope, extension cords, firearm)  · Get professional help from the community (Mental Health, Substance Abuse, psychological counseling)  · Do not be alone:Call your Safe Contact- someone whom you trust who will be there for you.  · Call your local CRISIS HOTLINE 800-0091 or 416-710-3690  · Call your local Children's Mobile Crisis Response Team [place] (104) 342-1785 or www.Radiance  · Call the toll free National Suicide Prevention Hotlines   · National Suicide Prevention Lifeline 533-877-ZRBE (1616)  · National Hope Line Network 800-SUICIDE (884-6171)    DISCHARGE SURVEY:  Thank you for choosing [facility].  We hope we provided you with very good care.  You may be receiving a survey.  Your opinion is valuable to us.    ADDITIONAL EDUCATIONAL MATERIALS  GIVEN TO PATIENT: none    My signature on this form indicates that:  1.  I have reviewed and understand the above information  2.  My questions regarding this information have been answered to my satisfaction.  3.  I have formulated a plan with my discharge nurse to obtain my prescribed medication for home.

## 2018-04-24 NOTE — PROGRESS NOTES
8398- Bedside report received from JHON Huerta.  Patient denied needs.  3138- Patient assessment done.  Patient stated that she is voiding without difficulty and passing flatus.  Patient denied dizziness and stated that she is walking without difficulty.  Discussed pain management plan and patient prefers to call for pain intervention as needed.  Reviewed plan of care.  FOB at bedside.

## 2018-04-24 NOTE — DISCHARGE SUMMARY
Discharge Summary:      Cinthia Portillo      Admit Date:   2018  Discharge Date:  2018     Admitting diagnosis:  Pregnancy   Normal labor  Discharge Diagnosis: Status post vaginal, spontaneous.  Pregnancy Complications: none  Tubal Ligation:  no        History:  Past Medical History:   Diagnosis Date   • Pregnant      OB History    Para Term  AB Living   2 1 1 0 0 1   SAB TAB Ectopic Molar Multiple Live Births   0 0 0   0 1      # Outcome Date GA Lbr Ankur/2nd Weight Sex Delivery Anes PTL Lv   2 Term 09/05/15 40w0d  3.345 kg (7 lb 6 oz) M Vag-Vacuum  N KATLIN   1                     Patient has no known allergies.  Patient Active Problem List    Diagnosis Date Noted   • Supervision of normal pregnancy 2015        Hospital Course:   22 y.o. , now para 2, was admitted with the above mentioned diagnosis, underwent Active Labor, vaginal, spontaneous. Patient postpartum course was unremarkable, with progressive advancement in diet , ambulation and toleration of oral analgesia. Patient without complaints today and desires discharge.      Vitals:    18 0000 18 0748 18 2000 18 0821   BP: 110/71 120/75 118/72 (!) 97/67   Pulse: 96 95 84 80   Resp: 16 20 16 18   Temp: 36.6 °C (97.8 °F) 36.7 °C (98 °F) 36.3 °C (97.4 °F) 36.4 °C (97.6 °F)   TempSrc:       SpO2: 98% 97% 95% 99%   Weight:       Height:           Current Facility-Administered Medications   Medication Dose   • oxytocin (PITOCIN) infusion (for postpartum)   mL/hr   • oxyCODONE-acetaminophen (PERCOCET-10)  MG per tablet 1 Tab  1 Tab   • oxytocin (PITOCIN) 20 UNITS/1000ML LR (induction of labor)  0.5-20 rodriguez-units/min   • lactated ringers infusion  1,000 mL   • LR infusion     • miSOPROStol (CYTOTEC) tablet 600 mcg  600 mcg   • docusate sodium (COLACE) capsule 100 mg  100 mg   • magnesium hydroxide (MILK OF MAGNESIA) suspension 30 mL  30 mL   • prenatal plus vitamin (STUARTNATAL 1+1)  27-1 MG tablet 1 Tab  1 Tab   • PRN oxytocin (PITOCIN) (20 Units/1000 mL) PRN for excessive uterine bleeding - See Admin Instr  125-999 mL/hr   • ibuprofen (MOTRIN) tablet 600 mg  600 mg   • oxyCODONE-acetaminophen (PERCOCET) 5-325 MG per tablet 1 Tab  1 Tab       Exam:  Breast Exam: negative  Abdomen: Abdomen soft, non-tender. BS normal. No masses,  No organomegaly  Fundus Non Tender: yes  Incision: none  Perineum: perineum intact  Extremity: extremities, peripheral pulses and reflexes normal     Labs:  Recent Labs      04/21/18   2347  04/22/18   2355   WBC  9.8  10.7   RBC  3.89*  3.26*   HEMOGLOBIN  11.1*  9.4*   HEMATOCRIT  33.9*  28.4*   MCV  87.1  87.1   MCH  28.5  28.8   MCHC  32.7*  33.1*   RDW  41.7  41.1   PLATELETCT  246  201   MPV  10.0  9.8        Activity:   Discharge to home  Pelvic Rest x 6 weeks    Assessment:  normal postpartum course  Discharge Assessment: Taking adequate diet and fluids     Follow up: .with Dr. Espinoza in 6 weeks      Discharge Meds:   No current outpatient prescriptions on file.       Vickie Espinoza M.D.

## 2018-10-24 ENCOUNTER — OFFICE VISIT (OUTPATIENT)
Dept: BEHAVIORAL HEALTH | Facility: CLINIC | Age: 23
End: 2018-10-24
Payer: COMMERCIAL

## 2018-10-24 DIAGNOSIS — Z63.0 MARITAL RELATIONSHIP PROBLEM: ICD-10-CM

## 2018-10-24 PROCEDURE — 90791 PSYCH DIAGNOSTIC EVALUATION: CPT | Performed by: SOCIAL WORKER

## 2018-10-24 SDOH — SOCIAL STABILITY - SOCIAL INSECURITY: PROBLEMS IN RELATIONSHIP WITH SPOUSE OR PARTNER: Z63.0

## 2018-10-25 NOTE — BH THERAPY
[facility]  INITIAL ASSESSMENT    Name: Cinthia Portillo  MRN: [id number]  : 1995  Age: 23 y.o.  Date of assessment: 10/25/2018  PCP: Pcp Pt States None  Persons in attendance: Patient and Spouse/Partner  Total session time: 45 minutes      CHIEF COMPLAINT AND HISTORY OF PRESENTING PROBLEM:  (as stated by Patient and Spouse/Partner):  Cinthia Portillo is a 23 y.o., White female referred for assessment by No ref. provider found.  Primary presenting issue includes   Chief Complaint   Patient presents with   • Initial  Evaluation   . Marital Relationship Problem    FAMILY/SOCIAL HISTORY  Current living situation/household members: Cinthia and Suly live together with their two sons ages three and six months.  Relevant family history/structure/dynamics: Cinthia reports she was raised with both parents until age [age] when they .  She has an older sister and one brother a year younger.  Reports good relationship with both parents and sister.  No conflict with brother though they are not close.    Suly was born and raised in [place] and has one younger brother.  Parents  when he was a young child and he spent time with both.  Reports good relationship with his father and stepmother and conflicted relationship with mother and brother.    This is the first marriage for both and they have two sons as per above.  Neither has other children.   Current family/social stressors: Current primary stressor is relationship problems in the marriage.  Both express stress related to responsibility of being  and parents of two young children.  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  Cinthia was at [employer] working to complete bachelor's degree in General Studies when she became pregnant and they decided to  and begin a family.  Each voices feeling disappointed they did not  Quality/quantity of current family and/or  social support: Support system includes each other frients, Cinthia's mother and Suly mother in law.   Does patient/parent report a family history of behavioral health issues, diagnoses, or treatment? No  Family History   Problem Relation Age of Onset   • Cancer Father         prostate        BEHAVIORAL HEALTH TREATMENT HISTORY  Does patient/parent report a history of prior behavioral health treatment for patient? No:    History of untreated behavioral health issues identified? No    MEDICAL HISTORY  Primary care behavioral health screenings: Patient Health Questionaire                                     If depressive symptoms identified deferred to follow up visit unless specifically addressed in assesment and plan.    Interpretation of PHQ-9 Total Score   Score Severity   1-4 No Depression   5-9 Mild Depression   10-14 Moderate Depression   15-19 Moderately Severe Depression   20-27 Severe Depression       Past medical/surgical history:   Past Medical History:   Diagnosis Date   • Pregnant       History reviewed. No pertinent surgical history.     Medication Allergies:  Patient has no known allergies.   Medical history provided by patient during current evaluation: None identified.    Patient reports last physical exam: unknown  Does patient/parent report any history of or current developmental concerns? No  Does patient/parent report nutritional concerns? No  Does patient/parent report change in appetite or weight loss/gain? No  Does patient/parent report history of eating disorder symptoms? No  Does patient/parent report dental problem? No  Does patient/parent report physical pain? No   Indicate if pain is acute or chronic, and location: none   Pain scale rating:       Does patient/parent report functional impact of medical, developmental, or pain issues?   no    EDUCATIONAL/LEARNING HISTORY  Is patient currently enrolled in a school/educational program?   No:   Highest grade level completed: Cinthia completed  three years at [employer].  Suly graduated from high school and completed [employer] certification.  School performance/functioning: unknown  History of Special Education/repeated grades/learning issues: no  Preferred learning style: not reported  Current learning needs (large print, language barrier, etc):  none    EMPLOYMENT/RESOURCES  Is the patient currently employed? 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   Does the patient/parent report adequate financial resources? Yes  Does patient identify impact of presenting issue on work functioning? No  Work or income-related stressors:  None reported     HISTORY:  Does patient report current or past enlistment? 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    [If yes, complete below items]  Does patient report history of exposure to combat? Unknown  Does patient report history of  sexual trauma? No  Does patient report other -related stressors? No    SPIRITUAL/CULTURAL/IDENTITY:  What are the patient’s/family’s spiritual beliefs or practices? Christians  What is the patient’s cultural or ethnic background/identity? Cinthia is , Suly   How does the patient identify their sexual orientation? Heterosexual  How does the patient identify their gender? Male and female.  Does the patient identify any spiritual/cultural/identity factors as relevant to the presenting issue? No    LEGAL HISTORY  Has the patient ever been involved with juvenile, adult, or family legal systems? No   [If yes, trigger section below:]  Does patient report ever being a victim of a crime?  No  Does patient report involvement in any current legal issues?  No  Does patient report ever being arrested or committing a crime? No  Does patient report any current agency (parole/probation/CPS/) involvement? No    ABUSE/NEGLECT/TRAUMA SCREENING  Does patient report feeling “unsafe” in his/her home, or afraid of  anyone? No  Does patient report any history of physical, sexual, or emotional abuse? No  Does parent or significant other report any of the above? No  Is there evidence of neglect by self? No  Is there evidence of neglect by a caregiver? No  Does the patient/parent report any history of CPS/APS/police involvement related to suspected abuse/neglect or domestic violence? No  Does the patient/parent report any other history of potentially traumatic life events? No  Based on the information provided during the current assessment, is a mandated report of suspected abuse/neglect being made?  No     SAFETY ASSESSMENT - SELF  Does patient acknowledge current or past symptoms of dangerousness to self? No  Does parent/significant other report patient has current or past symptoms of dangerousness to self? No      Recent change in frequency/specificity/intensity of suicidal thoughts or self-harm behavior? No  Current access to firearms, medications, or other identified means of suicide/self-harm? No  If yes, willing to restrict access to means of suicide/self-harm? No  Protective factors present: Future-oriented, Good impulse control, Positive coping skills, Positive self-efficacy and Reasons for living identified by patient: Family, children    Current Suicide Risk: Low  Crisis Safety Plan completed and copy given to patient: No    SAFETY ASSESSMENT - OTHERS  Does paor past symptoms of aggressive behavior or risk to others? No  Does parent/significant othtient acknowledge current or past symptoms of aggressive behavior or risk to others? No  Does parent/significant other report patient has current or past symptoms of aggressive behavior or risk to others? No    Recent change in frequency/specificity/intensity of thoughts or threats to harm others? No  Current access to firearms/other identified means of harm? No  If yes, willing to restrict access to weapons/means of harm? No  Protective factors present: Moral/spiritual  "prohibition, Well-developed sense of empathy, Positive impulse-control, Stable relationships, Stable employment and No current and history of HI .    Current Homicide Risk:  Low  Crisis Safety Plan completed and copy given to patient? No  Based on information provided during the current assessment, is a mandated “duty to warn” being exercised? No    SUBSTANCE USE/ADDICTION HISTORY  [] Not applicable - patient 10 years of age or younger    Is there a family history of substance use/addiction? No  Does patient acknowledge or parent/significant other report use of/dependence on substances? No  Last time patient used alcohol: Cinthia does not drink, Suly acknowledges drinking recently.  Within the past week? Suly has been drinking on days off.  Last time patient used marijuana: no use  Within the past month? No  Any other street drugs ever tried even once? No  Any use of prescription medications/pills without a prescription, or for reasons others than originally prescribed?  No  Any other addictive behavior reported (gambling, shopping, sex)? No     Drug History:  Amphetamine:      Cannibis:      Cocaine:      Ecstasy:      Hallucinogen:      Inhalant:       Opiate:      Other:      Sedative:           What consequences does the patient associate with any of the above substance use and or addictive behaviors? None    Patient’s motivation/readiness for change: not contemplating    [] Patient denies use of any substance/addictive behaviors    STRENGTHS/ASSETS  Strengths Identified by interviewer: Insight into problems, Evidence of good judgement, Self-awareness, Family suppport, Social support, Stable relationships and Problem-solving skills  Strengths Identified by patient: Cinthia reports she is considerate and thinks of other's feelings.  Suly says he \"gets things done.\"    MENTAL STATUS/OBSERVATIONS   Participation: Active verbal participation, Engaged and Open to feedback  Grooming: Casual and " Neat  Orientation:Fully Oriented   Behavior: Agitated and Tense  Eye contact: Fair   Mood:Depressed  Affect:Congruent with content, Sad and Tearful  Thought process: Logical  Thought content:  Within normal limits  Speech: Rate within normal limits and Soft  Perception: Within normal limits  Memory: No gross evidence of memory deficits  Insight: Adequate  Judgment:  Adequate  Other:    Family/couple interaction observations:     RESULTS OF SCREENING MEASURES:  [] Not applicable  Measure:   Score:     Measure:   Score:       CLINICAL FORMULATION:    Patient is a 23 year old female who appears to be of stated age presents with  Suly 26.  They have been together five years and  two years.  They have two sons ages three and six months.  Cinthia reports significant distress past two weeks since Suly informed her he is unhappy in the relationship and is uncertain whether he wants to continue in the marriage.  Cinthia acknowledges over the past several years she has felt similarly and has told her  of her lack of commitment to the marriage.  Cinthia does say her feelings have evolved and she is now certain she wants to work on the marriage if there is hope that he could be happy in the relationship,  Both deny current and history of SI or thoughts of self-harm and also deny HI or thought to harm others.  Stream of mental activity is logical, relevant, coherent, and future oriented.  Both agree to use kindness and respect in their communication and to allow each other time and space to sort through their respective thoughts and feelings until next session.      DIAGNOSTIC IMPRESSION(S):  1. Postpartum depression    2. Marital relationship problem          IDENTIFIED NEEDS/PLAN:  [If any of these marked, trigger DISPOSITION list]  Family/Couples conflict  Refer to [facility]: Outpatient Therapy    Does patient express agreement with the above plan? Yes     Referral appointment(s)  scheduled? Yes       Corinne G. Taylor, L.C.SMAXI.

## 2018-11-02 ENCOUNTER — OFFICE VISIT (OUTPATIENT)
Dept: BEHAVIORAL HEALTH | Facility: CLINIC | Age: 23
End: 2018-11-02
Payer: COMMERCIAL

## 2018-11-02 DIAGNOSIS — F43.23 ADJUSTMENT DISORDER WITH MIXED ANXIETY AND DEPRESSED MOOD: ICD-10-CM

## 2018-11-02 DIAGNOSIS — Z63.0 STRESS DUE TO MARITAL PROBLEMS: ICD-10-CM

## 2018-11-02 PROCEDURE — 90791 PSYCH DIAGNOSTIC EVALUATION: CPT | Performed by: PSYCHOLOGIST

## 2018-11-02 SDOH — SOCIAL STABILITY - SOCIAL INSECURITY: PROBLEMS IN RELATIONSHIP WITH SPOUSE OR PARTNER: Z63.0

## 2018-11-07 NOTE — BH THERAPY
[facility]  INITIAL ASSESSMENT    Name: Cinthia Portillo  MRN: [id number]  : 1995  Age: 23 y.o.  Date of assessment: 2018  PCP: Pcp Pt States None  Persons in attendance: Patient  Total session time: 50 minutes      CHIEF COMPLAINT AND HISTORY OF PRESENTING PROBLEM:  (as stated by Patient):  Cinthia Portillo is a 23 y.o., White female referred for assessment by No ref. provider found.  Primary presenting issue includes   Chief Complaint   Patient presents with   • Depression   • Stress   • Initial  Evaluation   .  Cinthia presents with difficulty adjusting to marital problems and depression and anxiety.  Patient reports her  suddenly left a note that he was leaving and was just gone and he blamed it on his own issues and family dynamics and feeling trapped in a marriage through an unplanned pregnancy.  Patient is very distraught, scared, and confused.  Patient recently had her second child which was planned about 6 months ago.  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  Patient works part-time as a  now patient dropped out of school because of the pregnancy patient has support from older sister mother who lives in [place] and father who lives in [place].  Patient denies suicidal ideation, homicidal ideation, symptoms of franky, psychosis or delusional thinking, eating disorder, or substance abuse.  Patient reports poor sleep mostly because of disruption from the baby.  Patient feeling overwhelmed by the sadness of her situation.  Patient was  5 years    FAMILY/SOCIAL HISTORY  Current living situation/household members: lives with two kids, 6 mos and 3. Suly Husain, has moved out.   Relevant family history/structure/dynamics:  parents  Current family/social stressors: husb leaving Pt  Quality/quantity of current family and/or social support: has  some friends and supportive family, including husb parents.   Does patient/parent report a family history of behavioral health issues, diagnoses, or treatment? No  Family History   Problem Relation Age of Onset   • Cancer Father         prostate        BEHAVIORAL HEALTH TREATMENT HISTORY  Does patient/parent report a history of prior behavioral health treatment for patient? No:    History of untreated behavioral health issues identified? No    MEDICAL HISTORY  Primary care behavioral health screenings: Patient Health Questionaire                                     If depressive symptoms identified deferred to follow up visit unless specifically addressed in assesment and plan.    Interpretation of PHQ-9 Total Score   Score Severity   1-4 No Depression   5-9 Mild Depression   10-14 Moderate Depression   15-19 Moderately Severe Depression   20-27 Severe Depression       Past medical/surgical history:   Past Medical History:   Diagnosis Date   • Pregnant       No past surgical history on file.     Medication Allergies:  Patient has no known allergies.   Medical history provided by patient during current evaluation: healthy    Patient reports last physical exam: 2018  Does patient/parent report any history of or current developmental concerns? No  Does patient/parent report nutritional concerns? No  Does patient/parent report change in appetite or weight loss/gain? No  Does patient/parent report history of eating disorder symptoms? No  Does patient/parent report dental problem? No  Does patient/parent report physical pain? No   Indicate if pain is acute or chronic, and location: na   Pain scale rating:       Does patient/parent report functional impact of medical, developmental, or pain issues?   no    EDUCATIONAL/LEARNING HISTORY  Is patient currently enrolled in a school/educational program?   No:   Highest grade level completed: some college  School performance/functioning: good  History of Special  Education/repeated grades/learning issues: no  Preferred learning style: unkn  Current learning needs (large print, language barrier, etc):  na    EMPLOYMENT/RESOURCES  Is the patient currently employed? Yes  Does the patient/parent report adequate financial resources? No  Does patient identify impact of presenting issue on work functioning? Yes  Work or income-related stressors:  financial     HISTORY:  Does patient report current or past enlistment? No    [If yes, complete below items]      SPIRITUAL/CULTURAL/IDENTITY:  What are the patient’s/family’s spiritual beliefs or practices? Confucianist  What is the patient’s cultural or ethnic background/identity? Cau  How does the patient identify their sexual orientation? hetero  How does the patient identify their gender? F  Does the patient identify any spiritual/cultural/identity factors as relevant to the presenting issue? No    LEGAL HISTORY  Has the patient ever been involved with juvenile, adult, or family legal systems? No   [If yes, trigger section below:]      ABUSE/NEGLECT/TRAUMA SCREENING  Does patient report feeling “unsafe” in his/her home, or afraid of anyone? No  Does patient report any history of physical, sexual, or emotional abuse? No  Does parent or significant other report any of the above? No  Is there evidence of neglect by self? No  Is there evidence of neglect by a caregiver? No  Does the patient/parent report any history of CPS/APS/police involvement related to suspected abuse/neglect or domestic violence? No  Does the patient/parent report any other history of potentially traumatic life events? No  Based on the information provided during the current assessment, is a mandated report of suspected abuse/neglect being made?  No     SAFETY ASSESSMENT - SELF  Does patient acknowledge current or past symptoms of dangerousness to self? No  Does parent/significant other report patient has current or past symptoms of dangerousness to self? No       Recent change in frequency/specificity/intensity of suicidal thoughts or self-harm behavior? No  Current access to firearms, medications, or other identified means of suicide/self-harm? No    Current Suicide Risk: Not applicable  Crisis Safety Plan completed and copy given to patient: No    SAFETY ASSESSMENT - OTHERS  Does paor past symptoms of aggressive behavior or risk to others? No  Does parent/significant othtient acknowledge current or past symptoms of aggressive behavior or risk to others? No  Does parent/significant other report patient has current or past symptoms of aggressive behavior or risk to others? No    Recent change in frequency/specificity/intensity of thoughts or threats to harm others? No  Current access to firearms/other identified means of harm? No    Current Homicide Risk:  Not applicable  Crisis Safety Plan completed and copy given to patient? No  Based on information provided during the current assessment, is a mandated “duty to warn” being exercised? No    SUBSTANCE USE/ADDICTION HISTORY  [] Not applicable - patient 10 years of age or younger    Is there a family history of substance use/addiction? No  Does patient acknowledge or parent/significant other report use of/dependence on substances? No  Last time patient used alcohol: na  Within the past week? No  Last time patient used marijuana: na  Within the past month? No  Any other street drugs ever tried even once? No  Any use of prescription medications/pills without a prescription, or for reasons others than originally prescribed?  No  Any other addictive behavior reported (gambling, shopping, sex)? No     Drug History:  Amphetamine:      Cannibis:      Cocaine:      Ecstasy:      Hallucinogen:      Inhalant:       Opiate:      Other:      Sedative:           What consequences does the patient associate with any of the above substance use and or addictive behaviors? None    Patient’s motivation/readiness for change: na    [] Patient  denies use of any substance/addictive behaviors    STRENGTHS/ASSETS  Strengths Identified by interviewer: Insight into problems, Evidence of good judgement, Self-awareness, Family suppport, Social support, Problem-solving skills, Cognitive flexibility and Social skills  Strengths Identified by patient: same    MENTAL STATUS/OBSERVATIONS   Participation: Active verbal participation, Attentive and Engaged  Grooming: Casual  Orientation:Alert and Fully Oriented   Behavior: Tense  Eye contact: Good   Mood:Depressed and Anxious  Affect:Sad, Anxious and Tearful  Thought process: Logical and Goal-directed  Thought content:  Within normal limits  Speech: Rate within normal limits  Perception: Within normal limits  Memory: No gross evidence of memory deficits  Insight: Good  Judgment:  Good  Other:    Family/couple interaction observations: na    RESULTS OF SCREENING MEASURES:  [] Not applicable  Measure:   Score:     Measure:   Score:       CLINICAL FORMULATION: Cinthia presents with depression and anxiety related to having her  suddenly leave the marriage and home with Pt having to care for a 3 yo and 6 mos old. Dx: adjustment d/o w depression and anxiety.      DIAGNOSTIC IMPRESSION(S):  1. Adjustment disorder with mixed anxiety and depressed mood    2. Stress due to marital problems          IDENTIFIED NEEDS/PLAN:  [If any of these marked, trigger DISPOSITION list]  Mood/anxiety  Refer to [facility]: Outpatient Therapy    Does patient express agreement with the above plan? Yes     Referral appointment(s) scheduled? Yes       Julia Mckeon, Ph.D.

## 2018-11-16 ENCOUNTER — OFFICE VISIT (OUTPATIENT)
Dept: BEHAVIORAL HEALTH | Facility: CLINIC | Age: 23
End: 2018-11-16
Payer: COMMERCIAL

## 2018-11-16 DIAGNOSIS — Z63.0 STRESS DUE TO MARITAL PROBLEMS: ICD-10-CM

## 2018-11-16 DIAGNOSIS — F43.23 ADJUSTMENT DISORDER WITH MIXED ANXIETY AND DEPRESSED MOOD: ICD-10-CM

## 2018-11-16 PROCEDURE — 90834 PSYTX W PT 45 MINUTES: CPT | Performed by: PSYCHOLOGIST

## 2018-11-16 SDOH — SOCIAL STABILITY - SOCIAL INSECURITY: PROBLEMS IN RELATIONSHIP WITH SPOUSE OR PARTNER: Z63.0

## 2018-11-16 NOTE — BH THERAPY
[facility]  Therapy Progress Note    Patient Name: Cinthia Portillo  Patient MRN: [id number]  Today's Date: 11/16/2018     Type of session:Individual psychotherapy  Length of session: 45 minutes  Persons in attendance:Patient    Subjective/New Info: Cinthia has been slowly pulling the pieces of her life together since her  left her and has been getting more in touch with her anger at his behaviors.  She discussed her concern that he has been introducing other women to her children and discuss how she talked to him about that.  Patient is moving forward getting legal assistance to securing a divorce and thinks she has helped her ask understand why this is a good idea instead of seeing it as a threat.  Patient processed her hurt and confusion over the lack of parental involvement he has been showing thus far.  Discussed what patient would like to do with her life and what direction she would like to take it, patient is unclear about who she wants to become and how to best support her family.      Objective/Observations:   Participation: Active verbal participation, Attentive and Engaged   Grooming: Casual   Cognition: Alert and Fully Oriented   Eye contact: Good   Mood: Depressed and Anxious   Affect: Sad and Anxious   Thought process: Logical and Goal-directed   Speech: Rate within normal limits   Other:     Diagnoses:   1. Adjustment disorder with mixed anxiety and depressed mood    2. Stress due to marital problems         Current risk:   SUICIDE: Not applicable   Homicide: Not applicable   Self-harm: Not applicable   Relapse: Not applicable   Other:    Safety Plan reviewed? Not Indicated   If evidence of imminent risk is present, intervention/plan:     Therapeutic Intervention(s): Cognitive modification, Conflict clarification, Establish rapport, Goal-setting, Parenting/familial roles addressed, Positive behavior reinforced, Self-care skills, Stressors assessed and Supportive  psychotherapy    Treatment Goal(s)/Objective(s) addressed: Tx Goals:  - Utilize learned skills to manage mood and emotional suffering more effectively  - Identify goals/values and cultivate a meaningful life  - Process & resolve issues of grief and loss of losing her marriage  - Learn to ameliorate effects of anxiety on life and functioning  - Practice co-operative parenting relationship  - Increase behaviors of self-compassion and self-care  - Secure a job or school admission towards future professional goals       Progress toward Treatment Goals: Mild improvement    Plan:  - Continue Individual therapy    Julia Mckeon, Ph.D.  11/16/2018

## 2018-12-14 ENCOUNTER — OFFICE VISIT (OUTPATIENT)
Dept: BEHAVIORAL HEALTH | Facility: CLINIC | Age: 23
End: 2018-12-14
Payer: COMMERCIAL

## 2018-12-14 DIAGNOSIS — F43.23 ADJUSTMENT DISORDER WITH MIXED ANXIETY AND DEPRESSED MOOD: ICD-10-CM

## 2018-12-14 DIAGNOSIS — Z63.0 STRESS DUE TO MARITAL PROBLEMS: ICD-10-CM

## 2018-12-14 PROCEDURE — 90834 PSYTX W PT 45 MINUTES: CPT | Performed by: PSYCHOLOGIST

## 2018-12-14 SDOH — SOCIAL STABILITY - SOCIAL INSECURITY: PROBLEMS IN RELATIONSHIP WITH SPOUSE OR PARTNER: Z63.0

## 2018-12-14 NOTE — BH THERAPY
[facility]  Therapy Progress Note    Patient Name: Cinthia Portillo  Patient MRN: [id number]  Today's Date: 12/14/2018     Type of session:Individual psychotherapy  Length of session: 45 minutes  Persons in attendance:Patient    Subjective/New Info: Cinthia is feeling a little more confident and sure of herself to be able to take care of her family.  She is increased hours for work and her mother has helped put in pay for her oldest in .  Patient expresses some concern over feeling depleted and not fully available to her children.  Discussed how this is a normal and natural reaction to everything she is been through lately.  Patient processed some of her annoyances with her Ex and states that she has a court date coming soon.  Both children are now visiting with her father 3 days a week.  Patient processed her feelings about her ex- having a girlfriend, someone that he was involved with even when the had not yet .    Objective/Observations:   Participation: Active verbal participation, Attentive and Engaged   Grooming: Casual   Cognition: Alert and Fully Oriented   Eye contact: Good   Mood: Depressed and Anxious   Affect: Sad and Anxious   Thought process: Logical and Goal-directed   Speech: Rate within normal limits   Other:     Diagnoses:   1. Adjustment disorder with mixed anxiety and depressed mood    2. Stress due to marital problems         Current risk:   SUICIDE: Not applicable   Homicide: Not applicable   Self-harm: Not applicable   Relapse: Not applicable   Other:    Safety Plan reviewed? Not Indicated   If evidence of imminent risk is present, intervention/plan:     Therapeutic Intervention(s): Clarify:  Clarify feelings, Cognitive modification, Parenting/familial roles addressed, Positive behavior reinforced, Self-care skills, Stressors assessed and Supportive psychotherapy    Treatment Goal(s)/Objective(s) addressed: Tx Goals:  - Utilize learned skills to manage  mood and emotional suffering more effectively  - Identify goals/values and cultivate a meaningful life  - Process & resolve issues of grief and loss of losing her marriage  - Learn to ameliorate effects of anxiety on life and functioning  - Practice co-operative parenting relationship  - Increase behaviors of self-compassion and self-care  - Secure a job or school admission towards future professional goals       Progress toward Treatment Goals: Moderate improvement    Plan:  - Continue Individual therapy    Julia Mckeon, Ph.D.  12/14/2018

## 2019-01-08 ENCOUNTER — OFFICE VISIT (OUTPATIENT)
Dept: BEHAVIORAL HEALTH | Facility: CLINIC | Age: 24
End: 2019-01-08
Payer: COMMERCIAL

## 2019-01-08 DIAGNOSIS — F43.23 ADJUSTMENT DISORDER WITH MIXED ANXIETY AND DEPRESSED MOOD: ICD-10-CM

## 2019-01-08 DIAGNOSIS — Z63.0 STRESS DUE TO MARITAL PROBLEMS: ICD-10-CM

## 2019-01-08 PROCEDURE — 90834 PSYTX W PT 45 MINUTES: CPT | Performed by: PSYCHOLOGIST

## 2019-01-08 SDOH — SOCIAL STABILITY - SOCIAL INSECURITY: PROBLEMS IN RELATIONSHIP WITH SPOUSE OR PARTNER: Z63.0

## 2019-01-08 NOTE — BH THERAPY
[facility]  Therapy Progress Note    Patient Name: Cinthia Portillo  Patient MRN: [id number]  Today's Date: 1/8/2019     Type of session:Individual psychotherapy  Length of session: 45 minutes  Persons in attendance:Patient    Subjective/New Info: Cinthia reported that the new year did not start off very well, she said that her ex left his current girlfriend and when he picked up the kids chronic camped out in her house and informed her that he was moving back in.  Patient was kind of blindsided by this change and it created a lot of hope in her and concerned.  Patient did not know how to set boundaries with him and tell him that he could not come back and live in the home.  And then just as quickly he gathered up his things informed her he was leaving and went back to his girlfriend.  Helped patient processed this help patient embrace the fact that he will not have the same emotional maturity as she does and that his moods and whims will blow in the wind.  Discussed the need to have better boundaries with him and to stick to the agreed plan.  Patient is nervous about upcoming court on Monday.    Objective/Observations:   Participation: Active verbal participation, Attentive and Engaged   Grooming: Casual   Cognition: Alert and Fully Oriented   Eye contact: Good   Mood: Depressed and Anxious   Affect: Sad and Anxious   Thought process: Logical and Goal-directed   Speech: Rate within normal limits   Other:     Diagnoses:   1. Adjustment disorder with mixed anxiety and depressed mood    2. Stress due to marital problems         Current risk:   SUICIDE: Not applicable   Homicide: Not applicable   Self-harm: Not applicable   Relapse: Not applicable   Other:    Safety Plan reviewed? Not Indicated   If evidence of imminent risk is present, intervention/plan:     Therapeutic Intervention(s): Clarify:  Clarify feelings, Cognitive modification, Communication skills, Limit-setting, Parenting/familial roles  addressed, Positive behavior reinforced, Self-care skills, Stressors assessed and Supportive psychotherapy    Treatment Goal(s)/Objective(s) addressed: Tx Goals:  - Utilize learned skills to manage mood and emotional suffering more effectively  - Identify goals/values and cultivate a meaningful life  - Process & resolve issues of grief and loss of losing her marriage  - Learn to ameliorate effects of anxiety on life and functioning  - Practice co-operative parenting relationship  - Increase behaviors of self-compassion and self-care  - Secure a job or school admission towards future professional goals       Progress toward Treatment Goals: Moderate improvement    Plan:  - Continue Individual therapy    Julia Mckeon, Ph.D.  1/8/2019

## 2019-01-22 ENCOUNTER — APPOINTMENT (OUTPATIENT)
Dept: BEHAVIORAL HEALTH | Facility: CLINIC | Age: 24
End: 2019-01-22
Payer: COMMERCIAL

## 2019-03-19 ENCOUNTER — OFFICE VISIT (OUTPATIENT)
Dept: BEHAVIORAL HEALTH | Facility: CLINIC | Age: 24
End: 2019-03-19
Payer: COMMERCIAL

## 2019-03-19 DIAGNOSIS — F43.23 ADJUSTMENT DISORDER WITH MIXED ANXIETY AND DEPRESSED MOOD: ICD-10-CM

## 2019-03-19 DIAGNOSIS — Z63.0 STRESS DUE TO MARITAL PROBLEMS: ICD-10-CM

## 2019-03-19 PROCEDURE — 90834 PSYTX W PT 45 MINUTES: CPT | Performed by: PSYCHOLOGIST

## 2019-03-19 SDOH — SOCIAL STABILITY - SOCIAL INSECURITY: PROBLEMS IN RELATIONSHIP WITH SPOUSE OR PARTNER: Z63.0

## 2019-03-19 NOTE — BH THERAPY
[facility]  Therapy Progress Note    Patient Name: Cinthia Portillo  Patient MRN: [id number]  Today's Date: 3/19/2019     Type of session:Individual psychotherapy  Length of session: 45 minutes  Persons in attendance:Patient    Subjective/New Info: Cinthia reports that she had her court date and everything seems to be settled, however her ex- continues to make unreasonable assumptions about who will pay what for childcare.  Patient processed her upset feelings at hearing her kids refer to ex-'s girlfriend as mama.  Patient continues to feel irritable, resentful, and angry when her kids go over there because she knows that he is not watching them and has instead taken over time at work.  Discussed not taking the kids when he calls and says he has over time and calmly asking him to keep to the schedule of visitations.  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  Patient reports that her father is planning to move to [place] and live very close to her and that will be a support and  as well.  Patient is trying to make plans to increase her earning power so she can be more self-sufficient and independent.    Objective/Observations:   Participation: Active verbal participation, Attentive and Engaged   Grooming: Casual   Cognition: Alert and Fully Oriented   Eye contact: Good   Mood: Depressed and Anxious   Affect: Sad, Anxious and Angry   Thought process: Logical and Goal-directed    Speech: Rate within normal limits   Other:     Diagnoses:   1. Adjustment disorder with mixed anxiety and depressed mood    2. Stress due to marital problems         Current risk:   SUICIDE: Not applicable   Homicide: Not applicable   Self-harm: Not applicable   Relapse: Not applicable   Other:    Safety Plan reviewed? Not Indicated   If evidence of imminent risk is present,  intervention/plan:     Therapeutic Intervention(s): Clarify:  Clarify feelings, Cognitive modification, Communication skills, Limit-setting, Parenting/familial roles addressed, Positive behavior reinforced, Self-care skills, Stressors assessed and Supportive psychotherapy    Treatment Goal(s)/Objective(s) addressed: Tx Goals:  - Utilize learned skills to manage mood and emotional suffering more effectively  - Identify goals/values and cultivate a meaningful life  - Process & resolve issues of grief and loss of losing her marriage  - Learn to ameliorate effects of anxiety on life and functioning  - Practice co-operative parenting relationship  - Increase behaviors of self-compassion and self-care  - Secure a job or school admission towards future professional goals       Progress toward Treatment Goals: Moderate improvement    Plan:  - Continue Individual therapy    Julia Mckeon, Ph.D.  3/19/2019

## 2021-11-27 ENCOUNTER — APPOINTMENT (OUTPATIENT)
Dept: RADIOLOGY | Facility: MEDICAL CENTER | Age: 26
End: 2021-11-27
Attending: EMERGENCY MEDICINE
Payer: COMMERCIAL

## 2021-11-27 ENCOUNTER — HOSPITAL ENCOUNTER (EMERGENCY)
Facility: MEDICAL CENTER | Age: 26
End: 2021-11-27
Attending: EMERGENCY MEDICINE
Payer: COMMERCIAL

## 2021-11-27 VITALS
TEMPERATURE: 98.3 F | BODY MASS INDEX: 21.94 KG/M2 | HEART RATE: 91 BPM | WEIGHT: 119.93 LBS | RESPIRATION RATE: 15 BRPM | OXYGEN SATURATION: 95 % | SYSTOLIC BLOOD PRESSURE: 127 MMHG | DIASTOLIC BLOOD PRESSURE: 67 MMHG

## 2021-11-27 DIAGNOSIS — N83.201 CYST OF RIGHT OVARY: ICD-10-CM

## 2021-11-27 DIAGNOSIS — R10.2 PELVIC CRAMPING: ICD-10-CM

## 2021-11-27 LAB
ALBUMIN SERPL BCP-MCNC: 4.4 G/DL (ref 3.2–4.9)
ALBUMIN/GLOB SERPL: 1.9 G/DL
ALP SERPL-CCNC: 80 U/L (ref 30–99)
ALT SERPL-CCNC: 43 U/L (ref 2–50)
ANION GAP SERPL CALC-SCNC: 9 MMOL/L (ref 7–16)
AST SERPL-CCNC: 20 U/L (ref 12–45)
BASOPHILS # BLD AUTO: 0.7 % (ref 0–1.8)
BASOPHILS # BLD: 0.04 K/UL (ref 0–0.12)
BILIRUB SERPL-MCNC: 0.6 MG/DL (ref 0.1–1.5)
BUN SERPL-MCNC: 11 MG/DL (ref 8–22)
CALCIUM SERPL-MCNC: 8.8 MG/DL (ref 8.5–10.5)
CHLORIDE SERPL-SCNC: 105 MMOL/L (ref 96–112)
CO2 SERPL-SCNC: 23 MMOL/L (ref 20–33)
CREAT SERPL-MCNC: 0.59 MG/DL (ref 0.5–1.4)
EOSINOPHIL # BLD AUTO: 0.09 K/UL (ref 0–0.51)
EOSINOPHIL NFR BLD: 1.5 % (ref 0–6.9)
ERYTHROCYTE [DISTWIDTH] IN BLOOD BY AUTOMATED COUNT: 41.1 FL (ref 35.9–50)
GLOBULIN SER CALC-MCNC: 2.3 G/DL (ref 1.9–3.5)
GLUCOSE SERPL-MCNC: 91 MG/DL (ref 65–99)
HCG SERPL QL: NEGATIVE
HCT VFR BLD AUTO: 42.7 % (ref 37–47)
HGB BLD-MCNC: 14.2 G/DL (ref 12–16)
IMM GRANULOCYTES # BLD AUTO: 0.02 K/UL (ref 0–0.11)
IMM GRANULOCYTES NFR BLD AUTO: 0.3 % (ref 0–0.9)
LYMPHOCYTES # BLD AUTO: 1.76 K/UL (ref 1–4.8)
LYMPHOCYTES NFR BLD: 28.9 % (ref 22–41)
MCH RBC QN AUTO: 30 PG (ref 27–33)
MCHC RBC AUTO-ENTMCNC: 33.3 G/DL (ref 33.6–35)
MCV RBC AUTO: 90.3 FL (ref 81.4–97.8)
MONOCYTES # BLD AUTO: 0.43 K/UL (ref 0–0.85)
MONOCYTES NFR BLD AUTO: 7 % (ref 0–13.4)
NEUTROPHILS # BLD AUTO: 3.76 K/UL (ref 2–7.15)
NEUTROPHILS NFR BLD: 61.6 % (ref 44–72)
NRBC # BLD AUTO: 0 K/UL
NRBC BLD-RTO: 0 /100 WBC
PLATELET # BLD AUTO: 234 K/UL (ref 164–446)
PMV BLD AUTO: 9.7 FL (ref 9–12.9)
POTASSIUM SERPL-SCNC: 4.1 MMOL/L (ref 3.6–5.5)
PROT SERPL-MCNC: 6.7 G/DL (ref 6–8.2)
RBC # BLD AUTO: 4.73 M/UL (ref 4.2–5.4)
SODIUM SERPL-SCNC: 137 MMOL/L (ref 135–145)
WBC # BLD AUTO: 6.1 K/UL (ref 4.8–10.8)

## 2021-11-27 PROCEDURE — 84703 CHORIONIC GONADOTROPIN ASSAY: CPT

## 2021-11-27 PROCEDURE — 99284 EMERGENCY DEPT VISIT MOD MDM: CPT

## 2021-11-27 PROCEDURE — 85025 COMPLETE CBC W/AUTO DIFF WBC: CPT

## 2021-11-27 PROCEDURE — 36415 COLL VENOUS BLD VENIPUNCTURE: CPT

## 2021-11-27 PROCEDURE — 80053 COMPREHEN METABOLIC PANEL: CPT

## 2021-11-27 PROCEDURE — 76856 US EXAM PELVIC COMPLETE: CPT

## 2021-11-27 RX ORDER — NAPROXEN 500 MG/1
375 TABLET ORAL 2 TIMES DAILY WITH MEALS
Qty: 20 TABLET | Refills: 0 | Status: SHIPPED | OUTPATIENT
Start: 2021-11-27

## 2021-11-27 NOTE — ED TRIAGE NOTES
25 y/o female ambulate to triage  Chief Complaint   Patient presents with   • Cramping     abd, started 3 days ago      Pt states she has IUD x 3 years, denies pregnancy, negative OTC test  C.o left breast lactation / tenderness started last night

## 2021-11-27 NOTE — ED NOTES
Assist RN: Pt discharged home. Assessment complete. Pt ambulates self. VS stable. Pt verbalized understanding discharge instructions. All belongings with pt. Prescriptions sent to pharmacy pt verbalizes teaching provided.

## 2021-11-27 NOTE — ED PROVIDER NOTES
ED Provider  Scribed for Davon Maloney D.O. by Randee Adame. 2021  9:56 AM    Means of arrival: Walk-in  History obtained from:Patient  History limited by: None    CHIEF COMPLAINT  Chief Complaint   Patient presents with    Cramping     abd, started 3 days ago      PPE Note: I personally donned full PPE for all patient encounters during this visit, including wearing an N95 respirator mask, gloves, and eye protection. Scribe remained outside the patient's room and did not have any contact with the patient for the duration of patient encounter.      HPI  Cinthiatritsan Portillo is a 26 y.o. female who presents with suprapubic abdominal cramping onset 3 days ago. She describes the pain as light contractions in nature. She is a  female with normal, full-term pregnancies. She had an IUD placed 3 years ago with no complications. She reports not having any menstrual periods since her IUD placement. She additionally started lactating yesterday. NO alleviating or exacerbating factors identified. She states the two at-home urine pregnancy tests she took yesterday resulted normal. She denies fever, chills, unusual vaginal discharge, vaginal bleeding, nausea, and vomiting. She has not recently seen her gynecologist secondary to insurance issues. She states she recently regained her health insurance and says she will make an appointment.     REVIEW OF SYSTEMS  See HPI for further details. All other systems are negative.     PAST MEDICAL HISTORY   has a past medical history of Pregnant.    SOCIAL HISTORY  Social History     Tobacco Use    Smoking status: Never Smoker    Smokeless tobacco: Never Used   Substance and Sexual Activity    Alcohol use: No    Drug use: No    Sexual activity: Yes     Partners: Male       SURGICAL HISTORY  patient denies any surgical history    CURRENT MEDICATIONS  Home Medications       Reviewed by Jazzy Dewitt R.N. (Registered Nurse) on 21 at 0834  Med List Status: <None>      Medication Last Dose Status   Docosahexaenoic Acid (PRENATAL DHA PO)  Active                    ALLERGIES  No Known Allergies    PHYSICAL EXAM  VITAL SIGNS: /79   Pulse 82   Temp 36.4 °C (97.6 °F) (Temporal)   Resp 15   Wt 54.4 kg (119 lb 14.9 oz)   SpO2 94%   BMI 21.94 kg/m²   Constitutional: Alert in no apparent distress.  HENT: No signs of trauma, mucous membranes are moist  Eyes: Conjunctiva normal, Non-icteric.   Neck: Normal range of motion, No tenderness, Supple.  Lymphatic: No lymphadenopathy noted.   Cardiovascular: Regular rate and rhythm, no murmurs.   Thorax & Lungs: Normal breath sounds, No respiratory distress, No wheezing, No chest tenderness.   Abdomen: Mild bilateral pelvic tenderness. Bowel sounds normal, Soft,  No masses, No pulsatile masses. No peritoneal signs.  Skin: Warm, Dry, normal color.   Back: No bony tenderness, No CVA tenderness.   Extremities: No edema, No tenderness, No cyanosis  Musculoskeletal: Good range of motion in all major joints. No tenderness to palpation or major deformities noted.   Neurologic: Alert and oriented x4, Normal motor function, Normal sensory function, No focal deficits noted.   Psychiatric: Affect normal, Judgment normal, Mood normal.       DIAGNOSTIC STUDIES / PROCEDURES    LABS  Results for orders placed or performed during the hospital encounter of 11/27/21   CBC WITH DIFFERENTIAL   Result Value Ref Range    WBC 6.1 4.8 - 10.8 K/uL    RBC 4.73 4.20 - 5.40 M/uL    Hemoglobin 14.2 12.0 - 16.0 g/dL    Hematocrit 42.7 37.0 - 47.0 %    MCV 90.3 81.4 - 97.8 fL    MCH 30.0 27.0 - 33.0 pg    MCHC 33.3 (L) 33.6 - 35.0 g/dL    RDW 41.1 35.9 - 50.0 fL    Platelet Count 234 164 - 446 K/uL    MPV 9.7 9.0 - 12.9 fL    Neutrophils-Polys 61.60 44.00 - 72.00 %    Lymphocytes 28.90 22.00 - 41.00 %    Monocytes 7.00 0.00 - 13.40 %    Eosinophils 1.50 0.00 - 6.90 %    Basophils 0.70 0.00 - 1.80 %    Immature Granulocytes 0.30 0.00 - 0.90 %    Nucleated RBC 0.00  /100 WBC    Neutrophils (Absolute) 3.76 2.00 - 7.15 K/uL    Lymphs (Absolute) 1.76 1.00 - 4.80 K/uL    Monos (Absolute) 0.43 0.00 - 0.85 K/uL    Eos (Absolute) 0.09 0.00 - 0.51 K/uL    Baso (Absolute) 0.04 0.00 - 0.12 K/uL    Immature Granulocytes (abs) 0.02 0.00 - 0.11 K/uL    NRBC (Absolute) 0.00 K/uL   COMP METABOLIC PANEL   Result Value Ref Range    Sodium 137 135 - 145 mmol/L    Potassium 4.1 3.6 - 5.5 mmol/L    Chloride 105 96 - 112 mmol/L    Co2 23 20 - 33 mmol/L    Anion Gap 9.0 7.0 - 16.0    Glucose 91 65 - 99 mg/dL    Bun 11 8 - 22 mg/dL    Creatinine 0.59 0.50 - 1.40 mg/dL    Calcium 8.8 8.5 - 10.5 mg/dL    AST(SGOT) 20 12 - 45 U/L    ALT(SGPT) 43 2 - 50 U/L    Alkaline Phosphatase 80 30 - 99 U/L    Total Bilirubin 0.6 0.1 - 1.5 mg/dL    Albumin 4.4 3.2 - 4.9 g/dL    Total Protein 6.7 6.0 - 8.2 g/dL    Globulin 2.3 1.9 - 3.5 g/dL    A-G Ratio 1.9 g/dL   HCG QUAL SERUM   Result Value Ref Range    Beta-Hcg Qualitative Serum Negative Negative   ESTIMATED GFR   Result Value Ref Range    GFR If African American >60 >60 mL/min/1.73 m 2    GFR If Non African American >60 >60 mL/min/1.73 m 2     All labs reviewed by me.    RADIOLOGY  US-PELVIC COMPLETE (TRANSABDOMINAL/TRANSVAGINAL) (COMBO)   Final Result      1.  Large right apparent cyst measuring 6.1 x 5.4 x 5.4 cm in size.      2.  IUD within the endometrial cavity.        The radiologist's interpretations of all radiological studies have been reviewed by me.    Films have been independently by me      COURSE  Pertinent Labs & Imaging studies reviewed. (See chart for details)    9:56 AM - Patient seen and examined at bedside. Discussed plan of care. Ordered for HCG Qualitative, CBC with diff, CMP, and POCT Urine Pregnancy to evaluate her symptoms.     10:32 AM - Ordered US-Pelvic Transabdominal/Transvaginal Combination to further evaluate.     12:53 PM - Patient was reevaluated at bedside. Discussed lab and radiology results with the patient and discussed the  plan for discharge at this time with a prescription for naproxen and follow up instructions. Patient verbalizes understanding and agreement to this plan of care.      MEDICAL DECISION MAKING  This is a 26 y.o. female who presents with pelvic cramping.  She does have an IUD comes been there for 3 years.  She is never had problems before and she does complain of lactating of the left breast.  She has no vaginal discharge bleeding or odor.  Infection is not suspected at this time.  Her white blood cell count is normal.  Ultrasound shows a large cyst on the right which may be contributing to her pain.  She is referred to her personal OB/GYN physician for follow-up.  As she is discharged home with pain medication.  She is not pregnant there is no concern for ectopic pregnancy.    The patient will return for new or worsening symptoms and is stable at the time of discharge.    The patient is referred to a primary physician for blood pressure management, diabetic screening, and for all other preventative health concerns.    DISPOSITION:  Patient will be discharged home in stable condition.    FOLLOW UP:    Please call your GYN doctor on Monday to make a follow-up appointment  In 1 week      OUTPATIENT MEDICATIONS:  Discharge Medication List as of 11/27/2021  1:15 PM        START taking these medications    Details   naproxen (NAPROSYN) 500 MG Tab Take 0.75 Tablets by mouth 2 times a day with meals., Disp-20 Tablet, R-0, Normal             FINAL IMPRESSION  1. Pelvic cramping    2. Cyst of right ovary         Randee SEQUEIRA ([place]), am scribing for, and in the presence of, Davon Maloney D.O..    Electronically signed by: Randee Adame ([place]), 11/27/2021    Davon SEQUEIRA D.O. personally performed the services described in this documentation, as scribed by Randee Adame in my presence, and it is both accurate and complete.    The note accurately reflects work and decisions made by me.  Davon Maloney D.O.   11/27/2021  4:07 PM

## 2023-06-23 ENCOUNTER — OFFICE VISIT (OUTPATIENT)
Dept: URGENT CARE | Facility: PHYSICIAN GROUP | Age: 28
End: 2023-06-23
Payer: COMMERCIAL

## 2023-06-23 VITALS
TEMPERATURE: 97.5 F | RESPIRATION RATE: 16 BRPM | HEART RATE: 82 BPM | SYSTOLIC BLOOD PRESSURE: 108 MMHG | BODY MASS INDEX: 21.9 KG/M2 | OXYGEN SATURATION: 96 % | HEIGHT: 62 IN | DIASTOLIC BLOOD PRESSURE: 64 MMHG | WEIGHT: 119 LBS

## 2023-06-23 DIAGNOSIS — J06.9 VIRAL URI: ICD-10-CM

## 2023-06-23 LAB
FLUAV RNA SPEC QL NAA+PROBE: NEGATIVE
FLUBV RNA SPEC QL NAA+PROBE: NEGATIVE
RSV RNA SPEC QL NAA+PROBE: NEGATIVE
S PYO DNA SPEC NAA+PROBE: NOT DETECTED
SARS-COV-2 RNA RESP QL NAA+PROBE: NEGATIVE

## 2023-06-23 PROCEDURE — 3078F DIAST BP <80 MM HG: CPT

## 2023-06-23 PROCEDURE — 99213 OFFICE O/P EST LOW 20 MIN: CPT

## 2023-06-23 PROCEDURE — 3074F SYST BP LT 130 MM HG: CPT

## 2023-06-23 PROCEDURE — 0241U POCT CEPHEID COV-2, FLU A/B, RSV - PCR: CPT

## 2023-06-23 PROCEDURE — 87651 STREP A DNA AMP PROBE: CPT

## 2023-06-23 RX ORDER — DEXAMETHASONE SODIUM PHOSPHATE 10 MG/ML
10 INJECTION INTRAMUSCULAR; INTRAVENOUS ONCE
Status: COMPLETED | OUTPATIENT
Start: 2023-06-23 | End: 2023-06-23

## 2023-06-23 RX ADMIN — DEXAMETHASONE SODIUM PHOSPHATE 10 MG: 10 INJECTION INTRAMUSCULAR; INTRAVENOUS at 09:59

## 2023-06-23 ASSESSMENT — ENCOUNTER SYMPTOMS
STRIDOR: 0
COUGH: 1
SHORTNESS OF BREATH: 1
FEVER: 1
SORE THROAT: 1
CHILLS: 1
SINUS PAIN: 0
MYALGIAS: 1
SPUTUM PRODUCTION: 0
WHEEZING: 1

## 2023-06-23 ASSESSMENT — FIBROSIS 4 INDEX: FIB4 SCORE: 0.38

## 2023-06-23 NOTE — LETTER
June 23, 2023    To Whom It May Concern:         This is confirmation that Cinthia Portillo attended her scheduled appointment with TIFFANIE Scharder on 6/23/23.Please excuse her from work  6/23/23-6/25/23.          If you have any questions please do not hesitate to call me at the phone number listed below.    Sincerely,          Delaney Salinas A.P.R.N.  [phone]

## 2023-06-23 NOTE — PROGRESS NOTES
Subjective:   Cinthia Portillo is a 28 y.o. female who presents for Sore Throat (Cough with mucus  ,fever ,chills ,body aches and ear pain ,bilateral x 7 day )      HPI: This is a 28-year-old female who presents today for sore throat, bilateral ear pain, fevers, chills, and body aches.  Patient reports symptoms developed 7 days ago.  She reports worsening throat pain over the last week.  She reports pain is 9\10.  She has taken Tylenol, ibuprofen, and cough drops without any improvement.  She reports that her son recently had sore throat.  She reports mild wheezing and shortness of breath.  Denies history of asthma.      Review of Systems   Constitutional:  Positive for chills, fever and malaise/fatigue.   HENT:  Positive for ear pain and sore throat. Negative for congestion, ear discharge and sinus pain.    Respiratory:  Positive for cough, shortness of breath and wheezing. Negative for sputum production and stridor.    Musculoskeletal:  Positive for myalgias.   All other systems reviewed and are negative.      Medications:    Current Outpatient Medications on File Prior to Visit   Medication Sig Dispense Refill    naproxen (NAPROSYN) 500 MG Tab Take 0.75 Tablets by mouth 2 times a day with meals. (Patient not taking: Reported on 6/23/2023) 20 Tablet 0    Docosahexaenoic Acid (PRENATAL DHA PO) Take  by mouth. (Patient not taking: Reported on 6/23/2023)       No current facility-administered medications on file prior to visit.        Allergies:   Patient has no known allergies.    Problem List:   Patient Active Problem List   Diagnosis    Supervision of normal pregnancy    Adjustment disorder with mixed anxiety and depressed mood    Stress due to marital problems        Surgical History:  No past surgical history on file.    Past Social Hx:   Social History     Tobacco Use    Smoking status: Never    Smokeless tobacco: Never   Substance Use Topics    Alcohol use: No    Drug use: No          Problem list,  "medications, and allergies reviewed by myself today in Epic.     Objective:     /64   Pulse 82   Temp 36.4 °C (97.5 °F) (Temporal)   Resp 16   Ht 1.575 m (5' 2\")   Wt 54 kg (119 lb)   SpO2 96%   BMI 21.77 kg/m²     Physical Exam  Vitals and nursing note reviewed.   Constitutional:       General: She is not in acute distress.     Appearance: Normal appearance. She is normal weight. She is not ill-appearing, toxic-appearing or diaphoretic.   HENT:      Head: Normocephalic and atraumatic.      Right Ear: Tympanic membrane, ear canal and external ear normal. There is no impacted cerumen.      Left Ear: Tympanic membrane, ear canal and external ear normal. There is no impacted cerumen.      Nose: Nose normal. No congestion or rhinorrhea.      Mouth/Throat:      Mouth: Mucous membranes are moist.      Pharynx: Oropharynx is clear. Posterior oropharyngeal erythema present. No oropharyngeal exudate.   Cardiovascular:      Rate and Rhythm: Normal rate and regular rhythm.      Pulses: Normal pulses.      Heart sounds: Normal heart sounds. No murmur heard.     No friction rub. No gallop.   Pulmonary:      Effort: Pulmonary effort is normal. No respiratory distress.      Breath sounds: Normal breath sounds. No stridor. No wheezing, rhonchi or rales.   Chest:      Chest wall: No tenderness.   Musculoskeletal:      Cervical back: Neck supple. No tenderness.   Lymphadenopathy:      Cervical: No cervical adenopathy.   Skin:     General: Skin is warm and dry.      Capillary Refill: Capillary refill takes less than 2 seconds.   Neurological:      General: No focal deficit present.      Mental Status: She is alert and oriented to person, place, and time. Mental status is at baseline.      Gait: Gait normal.   Psychiatric:         Mood and Affect: Mood normal.         Behavior: Behavior normal.         Thought Content: Thought content normal.         Judgment: Judgment normal.         Assessment/Plan:     Diagnosis and " associated orders:   1. Viral URI  POCT CoV-2, Flu A/B, RSV by PCR    dexamethasone (DECADRON) injection (check route below) 10 mg    POCT GROUP A STREP, PCR    CANCELED: POCT Rapid Strep A         Results for orders placed or performed in visit on 06/23/23   POCT CoV-2, Flu A/B, RSV by PCR   Result Value Ref Range    SARS-CoV-2 by PCR Negative Negative, Invalid    Influenza virus A RNA Negative Negative, Invalid    Influenza virus B, PCR Negative Negative, Invalid    RSV, PCR Negative Negative, Invalid       Comments/MDM:   Pt is clinically stable at today's acute urgent care visit.  No acute distress noted. Appropriate for outpatient management at this time.     Acute problem. Strep,Covid, Influenza, and RSV testing are all negative in clinic. [facility] I have discussed with patient that symptoms are viral in etiology. I have recommended supportive care to include; Increased fluids, rest, over-the-counter cold and flu medications, alternating Tylenol and/or ibuprofen per manufactures instructions for symptomatic relief and fevers, and the use of a humidifier. Patient is to return to UC or go to ER for any new or worsening s/s, and follow up with PCP for recheck. Patient is agreeable with plan of care and verbalized good understanding.              Discussed DDx, management options (risks,benefits, and alternatives to planned treatment), natural progression and supportive care.  Expressed understanding and the treatment plan was agreed upon. Questions were encouraged and answered   Return to urgent care prn if new or worsening sx or if there is no improvement in condition prn.    Educated in Red flags and indications to immediately call 911 or present to the Emergency Department.   Advised the patient to follow-up with the primary care physician for recheck, reevaluation, and consideration of further management.    I personally reviewed prior external notes and test results pertinent to  today's visit.  I have independently reviewed and interpreted all diagnostics ordered during this urgent care acute visit.       Please note that this dictation was created using voice recognition software. I have made a reasonable attempt to correct obvious errors, but I expect that there are errors of grammar and possibly content that I did not discover before finalizing the note.    This note was electronically signed by SOPHIA Coles